# Patient Record
Sex: FEMALE | Race: WHITE | NOT HISPANIC OR LATINO | Employment: UNEMPLOYED | URBAN - METROPOLITAN AREA
[De-identification: names, ages, dates, MRNs, and addresses within clinical notes are randomized per-mention and may not be internally consistent; named-entity substitution may affect disease eponyms.]

---

## 2023-02-24 LAB
ALANINE AMINOTRANSFERASE (SGPT) (U/L) IN SER/PLAS: 16 U/L (ref 7–45)
ALBUMIN (G/DL) IN SER/PLAS: 4.4 G/DL (ref 3.4–5)
ALKALINE PHOSPHATASE (U/L) IN SER/PLAS: 92 U/L (ref 33–110)
ANION GAP IN SER/PLAS: 12 MMOL/L (ref 10–20)
ASPARTATE AMINOTRANSFERASE (SGOT) (U/L) IN SER/PLAS: 17 U/L (ref 9–39)
BILIRUBIN TOTAL (MG/DL) IN SER/PLAS: 0.5 MG/DL (ref 0–1.2)
CALCIDIOL (25 OH VITAMIN D3) (NG/ML) IN SER/PLAS: 27 NG/ML
CALCIUM (MG/DL) IN SER/PLAS: 9.6 MG/DL (ref 8.6–10.6)
CARBON DIOXIDE, TOTAL (MMOL/L) IN SER/PLAS: 29 MMOL/L (ref 21–32)
CHLORIDE (MMOL/L) IN SER/PLAS: 104 MMOL/L (ref 98–107)
CHOLESTEROL (MG/DL) IN SER/PLAS: 180 MG/DL (ref 0–199)
CHOLESTEROL IN HDL (MG/DL) IN SER/PLAS: 44.4 MG/DL
CHOLESTEROL/HDL RATIO: 4.1
CREATININE (MG/DL) IN SER/PLAS: 0.84 MG/DL (ref 0.5–1.05)
ERYTHROCYTE DISTRIBUTION WIDTH (RATIO) BY AUTOMATED COUNT: 12.8 % (ref 11.5–14.5)
ERYTHROCYTE MEAN CORPUSCULAR HEMOGLOBIN CONCENTRATION (G/DL) BY AUTOMATED: 31.8 G/DL (ref 32–36)
ERYTHROCYTE MEAN CORPUSCULAR VOLUME (FL) BY AUTOMATED COUNT: 92 FL (ref 80–100)
ERYTHROCYTES (10*6/UL) IN BLOOD BY AUTOMATED COUNT: 5.12 X10E12/L (ref 4–5.2)
ESTIMATED AVERAGE GLUCOSE FOR HBA1C: 97 MG/DL
GFR FEMALE: >90 ML/MIN/1.73M2
GLUCOSE (MG/DL) IN SER/PLAS: 80 MG/DL (ref 74–99)
HEMATOCRIT (%) IN BLOOD BY AUTOMATED COUNT: 46.9 % (ref 36–46)
HEMOGLOBIN (G/DL) IN BLOOD: 14.9 G/DL (ref 12–16)
HEMOGLOBIN A1C/HEMOGLOBIN TOTAL IN BLOOD: 5 %
LDL: 113 MG/DL (ref 0–99)
LEUKOCYTES (10*3/UL) IN BLOOD BY AUTOMATED COUNT: 10.8 X10E9/L (ref 4.4–11.3)
NRBC (PER 100 WBCS) BY AUTOMATED COUNT: 0 /100 WBC (ref 0–0)
PLATELETS (10*3/UL) IN BLOOD AUTOMATED COUNT: 313 X10E9/L (ref 150–450)
POTASSIUM (MMOL/L) IN SER/PLAS: 4.6 MMOL/L (ref 3.5–5.3)
PROTEIN TOTAL: 7.1 G/DL (ref 6.4–8.2)
SODIUM (MMOL/L) IN SER/PLAS: 140 MMOL/L (ref 136–145)
THYROTROPIN (MIU/L) IN SER/PLAS BY DETECTION LIMIT <= 0.05 MIU/L: 1.79 MIU/L (ref 0.44–3.98)
TRIGLYCERIDE (MG/DL) IN SER/PLAS: 114 MG/DL (ref 0–149)
UREA NITROGEN (MG/DL) IN SER/PLAS: 16 MG/DL (ref 6–23)
VLDL: 23 MG/DL (ref 0–40)

## 2023-04-14 ENCOUNTER — LAB (OUTPATIENT)
Dept: LAB | Facility: LAB | Age: 38
End: 2023-04-14

## 2023-04-14 LAB — TOBACCO SCREEN, URINE: NEGATIVE

## 2023-12-20 ENCOUNTER — PATIENT MESSAGE (OUTPATIENT)
Dept: PRIMARY CARE | Facility: CLINIC | Age: 38
End: 2023-12-20
Payer: COMMERCIAL

## 2023-12-20 DIAGNOSIS — Z91.018 TREE NUT ALLERGY: Primary | ICD-10-CM

## 2023-12-21 RX ORDER — EPINEPHRINE 0.3 MG/.3ML
1 INJECTION, SOLUTION INTRAMUSCULAR ONCE AS NEEDED
Qty: 1 EACH | Refills: 0 | Status: SHIPPED | OUTPATIENT
Start: 2023-12-21

## 2024-05-16 ENCOUNTER — OFFICE VISIT (OUTPATIENT)
Dept: OBSTETRICS AND GYNECOLOGY | Facility: CLINIC | Age: 39
End: 2024-05-16
Payer: COMMERCIAL

## 2024-05-16 VITALS
WEIGHT: 187 LBS | HEIGHT: 67 IN | BODY MASS INDEX: 29.35 KG/M2 | SYSTOLIC BLOOD PRESSURE: 122 MMHG | DIASTOLIC BLOOD PRESSURE: 72 MMHG

## 2024-05-16 DIAGNOSIS — Z01.419 ENCOUNTER FOR ANNUAL ROUTINE GYNECOLOGICAL EXAMINATION: Primary | ICD-10-CM

## 2024-05-16 DIAGNOSIS — Z31.9 PATIENT DESIRES PREGNANCY: ICD-10-CM

## 2024-05-16 PROCEDURE — 99395 PREV VISIT EST AGE 18-39: CPT | Performed by: OBSTETRICS & GYNECOLOGY

## 2024-05-16 PROCEDURE — 1036F TOBACCO NON-USER: CPT | Performed by: OBSTETRICS & GYNECOLOGY

## 2024-05-16 ASSESSMENT — PAIN SCALES - GENERAL: PAINLEVEL: 0-NO PAIN

## 2024-09-05 ENCOUNTER — APPOINTMENT (OUTPATIENT)
Dept: OBSTETRICS AND GYNECOLOGY | Facility: CLINIC | Age: 39
End: 2024-09-05
Payer: COMMERCIAL

## 2024-09-06 ENCOUNTER — APPOINTMENT (OUTPATIENT)
Dept: OBSTETRICS AND GYNECOLOGY | Facility: CLINIC | Age: 39
End: 2024-09-06
Payer: COMMERCIAL

## 2024-09-09 ENCOUNTER — APPOINTMENT (OUTPATIENT)
Dept: OBSTETRICS AND GYNECOLOGY | Facility: CLINIC | Age: 39
End: 2024-09-09
Payer: COMMERCIAL

## 2024-09-12 ENCOUNTER — INITIAL PRENATAL (OUTPATIENT)
Dept: OBSTETRICS AND GYNECOLOGY | Facility: CLINIC | Age: 39
End: 2024-09-12
Payer: COMMERCIAL

## 2024-09-12 VITALS — DIASTOLIC BLOOD PRESSURE: 60 MMHG | WEIGHT: 184 LBS | SYSTOLIC BLOOD PRESSURE: 120 MMHG | BODY MASS INDEX: 28.82 KG/M2

## 2024-09-12 DIAGNOSIS — Z23 INFLUENZA VACCINE NEEDED: ICD-10-CM

## 2024-09-12 DIAGNOSIS — O09.91 SUPERVISION OF HIGH RISK PREGNANCY, ANTEPARTUM, FIRST TRIMESTER (HHS-HCC): Primary | ICD-10-CM

## 2024-09-12 DIAGNOSIS — Z3A.10 10 WEEKS GESTATION OF PREGNANCY (HHS-HCC): ICD-10-CM

## 2024-09-12 DIAGNOSIS — O09.521 AMA (ADVANCED MATERNAL AGE) MULTIGRAVIDA 35+, FIRST TRIMESTER (HHS-HCC): ICD-10-CM

## 2024-09-12 PROCEDURE — 90471 IMMUNIZATION ADMIN: CPT | Performed by: OBSTETRICS & GYNECOLOGY

## 2024-09-12 PROCEDURE — 0500F INITIAL PRENATAL CARE VISIT: CPT | Performed by: OBSTETRICS & GYNECOLOGY

## 2024-09-12 PROCEDURE — 87491 CHLMYD TRACH DNA AMP PROBE: CPT

## 2024-09-12 PROCEDURE — 87591 N.GONORRHOEAE DNA AMP PROB: CPT

## 2024-09-12 PROCEDURE — 87086 URINE CULTURE/COLONY COUNT: CPT

## 2024-09-12 PROCEDURE — 90656 IIV3 VACC NO PRSV 0.5 ML IM: CPT | Performed by: OBSTETRICS & GYNECOLOGY

## 2024-09-12 ASSESSMENT — EDINBURGH POSTNATAL DEPRESSION SCALE (EPDS)
I HAVE BLAMED MYSELF UNNECESSARILY WHEN THINGS WENT WRONG: NO, NEVER
I HAVE FELT SCARED OR PANICKY FOR NO GOOD REASON: NO, NOT AT ALL
I HAVE BEEN ABLE TO LAUGH AND SEE THE FUNNY SIDE OF THINGS: AS MUCH AS I ALWAYS COULD
I HAVE BEEN SO UNHAPPY THAT I HAVE BEEN CRYING: NO, NEVER
I HAVE FELT SAD OR MISERABLE: NO, NOT AT ALL
THE THOUGHT OF HARMING MYSELF HAS OCCURRED TO ME: NEVER
THINGS HAVE BEEN GETTING ON TOP OF ME: NO, I HAVE BEEN COPING AS WELL AS EVER
I HAVE BEEN ANXIOUS OR WORRIED FOR NO GOOD REASON: YES, SOMETIMES
I HAVE LOOKED FORWARD WITH ENJOYMENT TO THINGS: AS MUCH AS I EVER DID
TOTAL SCORE: 4
I HAVE BEEN SO UNHAPPY THAT I HAVE HAD DIFFICULTY SLEEPING: YES, SOMETIMES

## 2024-09-12 NOTE — PROGRESS NOTES
NEW OB VISIT    Assessment/Plan    Problem List Items Addressed This Visit       AMA (advanced maternal age) multigravida 35+, first trimester ([facility])    Overview     Reviewed options of aneuploidy screening. Desires cfDNA         Relevant Orders    [facility] Prenatal Screen    Supervision of high risk pregnancy, antepartum, first trimester ([facility]) - Primary    Relevant Orders    CBC Anemia Panel With Reflex,Pregnancy    Hemoglobin A1C    Hemoglobin Identification with Path Review    Hepatitis B Core Antibody, Total    Hepatitis B surface Ag    Hepatitis B Surface Antibody    Hepatitis C Antibody    HIV 1/2 Antigen/Antibody Screen with Reflex to Confirmation    Rubella IgG    Syphilis Screen with Reflex    Type And Screen    [facility] Prenatal Screen    10 weeks gestation of pregnancy ([facility])    Overview     Desired provider in labor: [] CNM  [x] Physician  [] Blood Products: [] Yes, accepts [] No, needs counseling  [] Initial BMI: Could not be calculated   [] Prenatal Labs: ordered 9/12  [x] Cervical Cancer Screening up to date, NILM/neg HPV 3/2023  [] Rh status:   [] Genetic Screening:  desires cfDNA, ordered  [] NT US: (11-13 wks): ordered  [] Baby ASA (if indicated): Not indicated  [] Pregnancy dated by:     [] Anatomy US: (19-20 wks)  [] Federal Sterilization consent signed (if indicated):  [] 1hr GCT at 24-28wks:  [] Rhogam (if indicated):   [] Fetal Surveillance (if indicated):  [] Tdap (27-32 wks, may be given up to 36 wks if initial window missed):   [] RSV (32-36 wks) (Sept. to end of Jan):   [x] Flu Vaccine: Given 9/12/24    [] Breastfeeding:  [] Postpartum Birth control method:   [] GBS at 36 - 37 wks:  [] 39 weeks discussion of IOL vs. Expectant management:  [] Mode of delivery ( anticipated ):           Relevant Orders    C. Trachomatis / N. Gonorrhoeae, Amplified Detection (Completed)    Urine culture    US MAC OB imaging order     Other Visit Diagnoses       Influenza vaccine needed         Relevant Orders    Flu vaccine, trivalent, preservative free, age 6 months and greater (Fluraix/Fluzone/Flulaval) (Completed)          Follow up in 4 weeks or sooner as needed    Paulina Lr MD        Subjective     Pat Rg is a 38 y.o.  at 10w3d by LMP alone who presents for an initial prenatal visit.     Using ginger/B6/unisom. Nausea minimal vomiting    OB Hx: 5 and 8yo sons  2017: VAVD, spontaneous labor, no epidural, second degree x 2  2019: uncomplicated  at [place]  Past Medical Hx: h/o PP depression  Past Surgical Hx: removal of wisdom teeth  Social Hx: Denies tobacco, alcohol, drugs  Family Hx: Non contributory  Medications: PNV  Allergies: epi pen for tree nut allergy  Pap Hx: NILM/neg HPV in 3/2023  Flu vaccine: accepts seasonal flu vaccine today     Physical Exam  Objective   /60   Wt 83.5 kg (184 lb)   LMP 2024   BMI 28.82 kg/m²      General:   Alert and oriented, in no acute distress

## 2024-09-13 PROBLEM — O09.521 AMA (ADVANCED MATERNAL AGE) MULTIGRAVIDA 35+, FIRST TRIMESTER (HHS-HCC): Status: ACTIVE | Noted: 2024-09-13

## 2024-09-13 PROBLEM — O09.91 SUPERVISION OF HIGH RISK PREGNANCY, ANTEPARTUM, FIRST TRIMESTER: Status: ACTIVE | Noted: 2024-09-13

## 2024-09-13 PROBLEM — Z3A.10 10 WEEKS GESTATION OF PREGNANCY (HHS-HCC): Status: ACTIVE | Noted: 2024-09-13

## 2024-09-13 LAB
C TRACH RRNA SPEC QL NAA+PROBE: NEGATIVE
N GONORRHOEA DNA SPEC QL PROBE+SIG AMP: NEGATIVE

## 2024-09-14 LAB — BACTERIA UR CULT: NO GROWTH

## 2024-09-20 ENCOUNTER — LAB (OUTPATIENT)
Dept: LAB | Facility: LAB | Age: 39
End: 2024-09-20
Payer: COMMERCIAL

## 2024-09-20 ENCOUNTER — HOSPITAL ENCOUNTER (OUTPATIENT)
Dept: RADIOLOGY | Facility: HOSPITAL | Age: 39
Discharge: HOME | End: 2024-09-20
Payer: COMMERCIAL

## 2024-09-20 DIAGNOSIS — Z3A.10 10 WEEKS GESTATION OF PREGNANCY (HHS-HCC): ICD-10-CM

## 2024-09-20 DIAGNOSIS — O09.91 SUPERVISION OF HIGH RISK PREGNANCY, ANTEPARTUM, FIRST TRIMESTER: ICD-10-CM

## 2024-09-20 LAB
ABO GROUP (TYPE) IN BLOOD: NORMAL
ANTIBODY SCREEN: NORMAL
ERYTHROCYTE [DISTWIDTH] IN BLOOD BY AUTOMATED COUNT: 12.4 % (ref 11.5–14.5)
EST. AVERAGE GLUCOSE BLD GHB EST-MCNC: 103 MG/DL
HBA1C MFR BLD: 5.2 %
HBV CORE AB SER QL: NONREACTIVE
HBV SURFACE AB SER-ACNC: 15.2 MIU/ML
HBV SURFACE AG SERPL QL IA: NONREACTIVE
HCT VFR BLD AUTO: 37.6 % (ref 36–46)
HCV AB SER QL: NONREACTIVE
HGB BLD-MCNC: 12.7 G/DL (ref 12–16)
HIV 1+2 AB+HIV1 P24 AG SERPL QL IA: NONREACTIVE
MCH RBC QN AUTO: 30 PG (ref 26–34)
MCHC RBC AUTO-ENTMCNC: 33.8 G/DL (ref 32–36)
MCV RBC AUTO: 89 FL (ref 80–100)
NRBC BLD-RTO: 0 /100 WBCS (ref 0–0)
PLATELET # BLD AUTO: 255 X10*3/UL (ref 150–450)
RBC # BLD AUTO: 4.24 X10*6/UL (ref 4–5.2)
REFLEX ADDED, ANEMIA PANEL: NORMAL
RH FACTOR (ANTIGEN D): NORMAL
RUBV IGG SERPL IA-ACNC: 3.9 IA
RUBV IGG SERPL QL IA: POSITIVE
TREPONEMA PALLIDUM IGG+IGM AB [PRESENCE] IN SERUM OR PLASMA BY IMMUNOASSAY: NONREACTIVE
WBC # BLD AUTO: 9.5 X10*3/UL (ref 4.4–11.3)

## 2024-09-20 PROCEDURE — 86850 RBC ANTIBODY SCREEN: CPT

## 2024-09-20 PROCEDURE — 86706 HEP B SURFACE ANTIBODY: CPT

## 2024-09-20 PROCEDURE — 83036 HEMOGLOBIN GLYCOSYLATED A1C: CPT

## 2024-09-20 PROCEDURE — 87389 HIV-1 AG W/HIV-1&-2 AB AG IA: CPT

## 2024-09-20 PROCEDURE — 76801 OB US < 14 WKS SINGLE FETUS: CPT

## 2024-09-20 PROCEDURE — 36415 COLL VENOUS BLD VENIPUNCTURE: CPT

## 2024-09-20 PROCEDURE — 83020 HEMOGLOBIN ELECTROPHORESIS: CPT | Performed by: OBSTETRICS & GYNECOLOGY

## 2024-09-20 PROCEDURE — 86704 HEP B CORE ANTIBODY TOTAL: CPT

## 2024-09-20 PROCEDURE — 85027 COMPLETE CBC AUTOMATED: CPT

## 2024-09-20 PROCEDURE — 86780 TREPONEMA PALLIDUM: CPT

## 2024-09-20 PROCEDURE — 83021 HEMOGLOBIN CHROMOTOGRAPHY: CPT

## 2024-09-20 PROCEDURE — 86803 HEPATITIS C AB TEST: CPT

## 2024-09-20 PROCEDURE — 86317 IMMUNOASSAY INFECTIOUS AGENT: CPT

## 2024-09-20 PROCEDURE — 86900 BLOOD TYPING SEROLOGIC ABO: CPT

## 2024-09-20 PROCEDURE — 86901 BLOOD TYPING SEROLOGIC RH(D): CPT

## 2024-09-20 PROCEDURE — 87340 HEPATITIS B SURFACE AG IA: CPT

## 2024-09-23 LAB
HEMOGLOBIN A2: 2.9 % (ref 2–3.5)
HEMOGLOBIN A: 96.8 % (ref 95.8–98)
HEMOGLOBIN F: 0.3 % (ref 0–2)
HEMOGLOBIN IDENTIFICATION INTERPRETATION: NORMAL
PATH REVIEW-HGB IDENTIFICATION: NORMAL

## 2024-10-03 LAB
COMMENTS - MP RESULT TYPE: NORMAL
SCAN RESULT: NORMAL

## 2024-10-11 ENCOUNTER — APPOINTMENT (OUTPATIENT)
Dept: OBSTETRICS AND GYNECOLOGY | Facility: CLINIC | Age: 39
End: 2024-10-11
Payer: COMMERCIAL

## 2024-10-11 VITALS — DIASTOLIC BLOOD PRESSURE: 70 MMHG | SYSTOLIC BLOOD PRESSURE: 120 MMHG | BODY MASS INDEX: 28.98 KG/M2 | WEIGHT: 185 LBS

## 2024-10-11 DIAGNOSIS — Z3A.14 14 WEEKS GESTATION OF PREGNANCY (HHS-HCC): ICD-10-CM

## 2024-10-11 DIAGNOSIS — O09.91 SUPERVISION OF HIGH RISK PREGNANCY, ANTEPARTUM, FIRST TRIMESTER: Primary | ICD-10-CM

## 2024-10-11 DIAGNOSIS — O09.521 AMA (ADVANCED MATERNAL AGE) MULTIGRAVIDA 35+, FIRST TRIMESTER (HHS-HCC): ICD-10-CM

## 2024-10-11 PROCEDURE — 0501F PRENATAL FLOW SHEET: CPT | Performed by: OBSTETRICS & GYNECOLOGY

## 2024-10-11 NOTE — PROGRESS NOTES
OB Follow up visit    ASSESSMENT & PLAN    Pat Rg is a 38 y.o.  at 14w4d presenting for routine prenatal care    Problem List Items Addressed This Visit       AMA (advanced maternal age) multigravida 35+, first trimester ([facility])    Overview     Rr cfDNA  Hgb A1c 5.2         Supervision of high risk pregnancy, antepartum, first trimester - Primary    14 weeks gestation of pregnancy ([facility])    Overview     Desired provider in labor: [] CNM  [x] Physician  [] Blood Products: [] Yes, accepts [] No, needs counseling  [x] Initial BMI: 29  [x] Prenatal Labs: Rh+, RI, Hgb 12.7  [x] Cervical Cancer Screening up to date, NILM/neg HPV 3/2023  [x] Rh status: positive  [x] Genetic Screening:  rr cfDNA  [x] NT US: (11-13 wks): normal  [x] Baby ASA (if indicated): Not indicated  [x] Pregnancy dated by: 11 week ultrasound (cycles irregular)    [] Anatomy US: (19-20 wks)  [] Federal Sterilization consent signed (if indicated):  [] 1hr GCT at 24-28wks:  [] Rhogam (if indicated):   [] Fetal Surveillance (if indicated):  [] Tdap (27-32 wks, may be given up to 36 wks if initial window missed):   [] RSV (32-36 wks) (Sept. to end of ):   [x] Flu Vaccine: Given 24    [] Breastfeeding:  [] Postpartum Birth control method:   [] GBS at 36 - 37 wks:  [] 39 weeks discussion of IOL vs. Expectant management:  [] Mode of delivery ( anticipated ):                 SUBJECTIVE    HPI: Pat Rg is a 38 y.o.  at 14w4d here for RPNV. Patient reports feeling better. N/V resolved      OBJECTIVE    Visit Vitals  /70   Wt 83.9 kg (185 lb)   LMP 2024   BMI 28.98 kg/m²   OB Status Pregnant   Smoking Status Never   BSA 1.99 m²        Paulina Lr MD

## 2024-10-15 DIAGNOSIS — O26.892 PREGNANCY HEADACHE IN SECOND TRIMESTER (HHS-HCC): Primary | ICD-10-CM

## 2024-10-15 DIAGNOSIS — R51.9 PREGNANCY HEADACHE IN SECOND TRIMESTER (HHS-HCC): Primary | ICD-10-CM

## 2024-10-15 RX ORDER — CYPROHEPTADINE HYDROCHLORIDE 4 MG/1
4 TABLET ORAL 3 TIMES DAILY PRN
Qty: 30 TABLET | Refills: 0 | Status: SHIPPED | OUTPATIENT
Start: 2024-10-15 | End: 2025-01-13

## 2024-10-28 ENCOUNTER — TELEPHONE (OUTPATIENT)
Dept: OBSTETRICS AND GYNECOLOGY | Facility: CLINIC | Age: 39
End: 2024-10-28
Payer: COMMERCIAL

## 2024-10-28 DIAGNOSIS — R51.9 PREGNANCY HEADACHE IN SECOND TRIMESTER (HHS-HCC): ICD-10-CM

## 2024-10-28 DIAGNOSIS — O26.892 PREGNANCY HEADACHE IN SECOND TRIMESTER (HHS-HCC): ICD-10-CM

## 2024-10-28 RX ORDER — CYPROHEPTADINE HYDROCHLORIDE 4 MG/1
4 TABLET ORAL 3 TIMES DAILY PRN
Qty: 30 TABLET | Refills: 1 | Status: SHIPPED | OUTPATIENT
Start: 2024-10-28 | End: 2025-01-26

## 2024-11-07 ENCOUNTER — APPOINTMENT (OUTPATIENT)
Dept: OBSTETRICS AND GYNECOLOGY | Facility: CLINIC | Age: 39
End: 2024-11-07
Payer: COMMERCIAL

## 2024-11-07 VITALS — DIASTOLIC BLOOD PRESSURE: 60 MMHG | SYSTOLIC BLOOD PRESSURE: 100 MMHG | BODY MASS INDEX: 29.91 KG/M2 | WEIGHT: 191 LBS

## 2024-11-07 DIAGNOSIS — O26.892 PREGNANCY HEADACHE IN SECOND TRIMESTER (HHS-HCC): ICD-10-CM

## 2024-11-07 DIAGNOSIS — O09.521 AMA (ADVANCED MATERNAL AGE) MULTIGRAVIDA 35+, FIRST TRIMESTER (HHS-HCC): ICD-10-CM

## 2024-11-07 DIAGNOSIS — R51.9 PREGNANCY HEADACHE IN SECOND TRIMESTER (HHS-HCC): ICD-10-CM

## 2024-11-07 DIAGNOSIS — O09.91 SUPERVISION OF HIGH RISK PREGNANCY, ANTEPARTUM, FIRST TRIMESTER: ICD-10-CM

## 2024-11-07 DIAGNOSIS — Z3A.18 18 WEEKS GESTATION OF PREGNANCY (HHS-HCC): Primary | ICD-10-CM

## 2024-11-07 PROCEDURE — 0501F PRENATAL FLOW SHEET: CPT | Performed by: OBSTETRICS & GYNECOLOGY

## 2024-11-15 ENCOUNTER — HOSPITAL ENCOUNTER (OUTPATIENT)
Dept: RADIOLOGY | Facility: HOSPITAL | Age: 39
Discharge: HOME | End: 2024-11-15
Payer: COMMERCIAL

## 2024-11-15 DIAGNOSIS — Z3A.10 10 WEEKS GESTATION OF PREGNANCY (HHS-HCC): ICD-10-CM

## 2024-11-15 PROCEDURE — 76811 OB US DETAILED SNGL FETUS: CPT

## 2024-12-06 ENCOUNTER — APPOINTMENT (OUTPATIENT)
Dept: OBSTETRICS AND GYNECOLOGY | Facility: CLINIC | Age: 39
End: 2024-12-06
Payer: COMMERCIAL

## 2024-12-06 ENCOUNTER — APPOINTMENT (OUTPATIENT)
Dept: ENDOCRINOLOGY | Facility: CLINIC | Age: 39
End: 2024-12-06
Payer: COMMERCIAL

## 2024-12-06 VITALS — BODY MASS INDEX: 31.32 KG/M2 | SYSTOLIC BLOOD PRESSURE: 100 MMHG | WEIGHT: 200 LBS | DIASTOLIC BLOOD PRESSURE: 68 MMHG

## 2024-12-06 DIAGNOSIS — O09.92 SUPERVISION OF HIGH-RISK PREGNANCY, SECOND TRIMESTER (HHS-HCC): Primary | ICD-10-CM

## 2024-12-06 DIAGNOSIS — Z3A.22 22 WEEKS GESTATION OF PREGNANCY (HHS-HCC): ICD-10-CM

## 2024-12-06 DIAGNOSIS — R51.9 PREGNANCY HEADACHE IN SECOND TRIMESTER (HHS-HCC): ICD-10-CM

## 2024-12-06 DIAGNOSIS — O09.521 AMA (ADVANCED MATERNAL AGE) MULTIGRAVIDA 35+, FIRST TRIMESTER (HHS-HCC): ICD-10-CM

## 2024-12-06 DIAGNOSIS — O26.892 PREGNANCY HEADACHE IN SECOND TRIMESTER (HHS-HCC): ICD-10-CM

## 2024-12-06 PROCEDURE — 0501F PRENATAL FLOW SHEET: CPT | Performed by: OBSTETRICS & GYNECOLOGY

## 2024-12-06 NOTE — PROGRESS NOTES
Ob Follow-up  24     SUBJECTIVE      HPI: Pat Rg is a 39 y.o.  at 22w3d here for RPNV.  She has no contractions, bleeding, or LOF. Reports normal fetal movement. Patient reports headaches better. Decreased in frequency.        OBJECTIVE  Visit Vitals  /68   Wt 90.7 kg (200 lb)   LMP 2024   BMI 31.32 kg/m²   OB Status Pregnant   Smoking Status Never   BSA 2.07 m²            ASSESSMENT & PLAN    Pat Rg is a 39 y.o.  at 22w3d here for the following concerns we addressed today:    Problem List Items Addressed This Visit       22 weeks gestation of pregnancy ([facility])    Overview     Desired provider in labor: [] CNM  [x] Physician  [x] Blood Products: [x] Yes, accepts [] No, needs counseling  [x] Initial BMI: 29  [x] Prenatal Labs: Rh+, RI, Hgb 12.7  [x] Cervical Cancer Screening up to date, NILM/neg HPV 3/2023  [x] Rh status: positive  [x] Genetic Screening:  rr cfDNA  [x] NT US: (11-13 wks): normal  [x] Baby ASA (if indicated): Not indicated  [x] Pregnancy dated by: 11 week ultrasound (cycles irregular)    [x] Anatomy US: (19-20 wks): normal  [] Federal Sterilization consent signed (if indicated):  [] 1hr GCT at 24-28wks: ordered for NV  [x] Rhogam (if indicated): Not indicated  [] Fetal Surveillance (if indicated):  [] Tdap (27-32 wks, may be given up to 36 wks if initial window missed):   [] RSV (32-36 wks) (Sept. to end of ):   [x] Flu Vaccine: Given 24    [] Breastfeeding:  [] Postpartum Birth control method:   [] GBS at 36 - 37 wks:  [] 39 weeks discussion of IOL vs. Expectant management:  [] Mode of delivery ( anticipated ):           AMA (advanced maternal age) multigravida 35+, first trimester ([facility])    Overview     Rr cfDNA  Hgb A1c 5.2         Pregnancy headache in second trimester ([facility])    Overview     Uses tylenol and periactin as needed  Improved, less frequent         Supervision of high-risk pregnancy, second trimester ([facility]) - Primary     Relevant Orders    CBC Anemia Panel With Reflex,Pregnancy    Glucose, 1 Hour Screen, Pregnancy    Syphilis Screen with Reflex     RTC in 4 weeks      Paulina Lr MD

## 2025-01-03 ENCOUNTER — APPOINTMENT (OUTPATIENT)
Dept: OBSTETRICS AND GYNECOLOGY | Facility: CLINIC | Age: 40
End: 2025-01-03
Payer: COMMERCIAL

## 2025-01-03 ENCOUNTER — TELEPHONE (OUTPATIENT)
Dept: OBSTETRICS AND GYNECOLOGY | Facility: CLINIC | Age: 40
End: 2025-01-03

## 2025-01-03 ENCOUNTER — LAB (OUTPATIENT)
Dept: LAB | Facility: LAB | Age: 40
End: 2025-01-03
Payer: COMMERCIAL

## 2025-01-03 VITALS — SYSTOLIC BLOOD PRESSURE: 120 MMHG | DIASTOLIC BLOOD PRESSURE: 68 MMHG | WEIGHT: 214 LBS | BODY MASS INDEX: 33.52 KG/M2

## 2025-01-03 DIAGNOSIS — O26.892 PREGNANCY HEADACHE IN SECOND TRIMESTER (HHS-HCC): ICD-10-CM

## 2025-01-03 DIAGNOSIS — O09.92 SUPERVISION OF HIGH-RISK PREGNANCY, SECOND TRIMESTER (HHS-HCC): ICD-10-CM

## 2025-01-03 DIAGNOSIS — O09.522 AMA (ADVANCED MATERNAL AGE) MULTIGRAVIDA 35+, SECOND TRIMESTER (HHS-HCC): Primary | ICD-10-CM

## 2025-01-03 DIAGNOSIS — R51.9 PREGNANCY HEADACHE IN SECOND TRIMESTER (HHS-HCC): ICD-10-CM

## 2025-01-03 DIAGNOSIS — Z3A.26 26 WEEKS GESTATION OF PREGNANCY (HHS-HCC): ICD-10-CM

## 2025-01-03 LAB
ERYTHROCYTE [DISTWIDTH] IN BLOOD BY AUTOMATED COUNT: 13.2 % (ref 11.5–14.5)
GLUCOSE 1H P 50 G GLC PO SERPL-MCNC: 104 MG/DL
HCT VFR BLD AUTO: 38.3 % (ref 36–46)
HGB BLD-MCNC: 12.4 G/DL (ref 12–16)
MCH RBC QN AUTO: 30 PG (ref 26–34)
MCHC RBC AUTO-ENTMCNC: 32.4 G/DL (ref 32–36)
MCV RBC AUTO: 93 FL (ref 80–100)
NRBC BLD-RTO: 0 /100 WBCS (ref 0–0)
PLATELET # BLD AUTO: 195 X10*3/UL (ref 150–450)
RBC # BLD AUTO: 4.13 X10*6/UL (ref 4–5.2)
REFLEX ADDED, ANEMIA PANEL: NORMAL
TREPONEMA PALLIDUM IGG+IGM AB [PRESENCE] IN SERUM OR PLASMA BY IMMUNOASSAY: NONREACTIVE
WBC # BLD AUTO: 8.8 X10*3/UL (ref 4.4–11.3)

## 2025-01-03 PROCEDURE — 82947 ASSAY GLUCOSE BLOOD QUANT: CPT

## 2025-01-03 PROCEDURE — 85027 COMPLETE CBC AUTOMATED: CPT

## 2025-01-03 PROCEDURE — 86780 TREPONEMA PALLIDUM: CPT

## 2025-01-03 PROCEDURE — 0501F PRENATAL FLOW SHEET: CPT | Performed by: OBSTETRICS & GYNECOLOGY

## 2025-01-03 NOTE — PROGRESS NOTES
Ob Follow-up  25     SUBJECTIVE      HPI: Pat Rg is a 39 y.o.  at 26w3d here for RPNV.  She has no contractions, bleeding, or LOF. Reports normal fetal movement. Patient reports tired       OBJECTIVE  Visit Vitals  /68   Wt 97.1 kg (214 lb)   LMP 2024   BMI 33.52 kg/m²   OB Status Pregnant   Smoking Status Never   BSA 2.14 m²            ASSESSMENT & PLAN    Pat Rg is a 39 y.o.  at 26w3d here for the following concerns we addressed today:    Problem List Items Addressed This Visit          Pregnancy    Pregnancy headache in second trimester ([facility])    Overview     Uses tylenol and periactin as needed  Improved, less frequent         AMA (advanced maternal age) multigravida 35+, second trimester ([facility]) - Primary    Overview     Rr cfDNA  Hgb A1c 5.2         26 weeks gestation of pregnancy ([facility])    Overview     Desired provider in labor: [] CNM  [x] Physician  [x] Blood Products: [x] Yes, accepts [] No, needs counseling  [x] Initial BMI: 29  [x] Prenatal Labs: Rh+, RI, Hgb 12.7  [x] Cervical Cancer Screening up to date, NILM/neg HPV 3/2023  [x] Rh status: positive  [x] Genetic Screening:  rr cfDNA  [x] NT US: (11-13 wks): normal  [x] Baby ASA (if indicated): Not indicated  [x] Pregnancy dated by: 11 week ultrasound (cycles irregular)    [x] Anatomy US: (19-20 wks): normal  [x] Federal Sterilization consent signed (if indicated): Declines BTL  [] 1hr GCT at 24-28wks: pending from today  [x] Rhogam (if indicated): Not indicated  [] Fetal Surveillance (if indicated):  [] Tdap (27-32 wks, may be given up to 36 wks if initial window missed): discussed for NV  [] RSV (32-36 wks) (Sept. to end of ):   [x] Flu Vaccine: Given 24    [x] Breastfeeding: plans to bf, need new pump  [x] Postpartum Birth control method: Discussed, considering ParaGard IUD at 6 week PP visit  [] GBS at 36 - 37 wks:  [] 39 weeks discussion of IOL vs. Expectant management:  [x] Mode of  delivery ( anticipated ):                 No orders of the defined types were placed in this encounter.    RTC in 4 weeks      Paulina Lr MD

## 2025-01-13 ENCOUNTER — OFFICE VISIT (OUTPATIENT)
Dept: URGENT CARE | Age: 40
End: 2025-01-13
Payer: COMMERCIAL

## 2025-01-13 VITALS
BODY MASS INDEX: 33.67 KG/M2 | HEART RATE: 84 BPM | OXYGEN SATURATION: 98 % | DIASTOLIC BLOOD PRESSURE: 74 MMHG | TEMPERATURE: 98 F | WEIGHT: 215 LBS | SYSTOLIC BLOOD PRESSURE: 123 MMHG | RESPIRATION RATE: 16 BRPM

## 2025-01-13 DIAGNOSIS — R09.81 NASAL CONGESTION: ICD-10-CM

## 2025-01-13 DIAGNOSIS — H66.91 ACUTE RIGHT OTITIS MEDIA: Primary | ICD-10-CM

## 2025-01-13 PROCEDURE — 99203 OFFICE O/P NEW LOW 30 MIN: CPT | Performed by: NURSE PRACTITIONER

## 2025-01-13 RX ORDER — AMOXICILLIN 875 MG/1
875 TABLET, FILM COATED ORAL 2 TIMES DAILY
Qty: 20 TABLET | Refills: 0 | Status: SHIPPED | OUTPATIENT
Start: 2025-01-13 | End: 2025-01-23

## 2025-01-13 ASSESSMENT — PAIN SCALES - GENERAL: PAINLEVEL_OUTOF10: 7

## 2025-01-13 ASSESSMENT — ENCOUNTER SYMPTOMS
SORE THROAT: 0
COUGH: 0
SINUS PRESSURE: 1
SINUS COMPLAINT: 1
FATIGUE: 1
SINUS PAIN: 1

## 2025-01-13 NOTE — PROGRESS NOTES
Subjective   Patient ID: Pat Rg is a 39 y.o. female. They present today with a chief complaint of Sinus Problem (Sinus congestion/pressure, right earache X 2 wks. ).    History of Present Illness    Sinus Problem  Associated symptoms: congestion, ear pain and fatigue    Associated symptoms: no cough and no sore throat         patient presents to urgent care for complaints of sinus pressure and congestion for 2 weeks.  Patient states for the past week her right ear has been hurting.  She states the past couple days she has had increasing pain in her right ear.  Patient is currently 28 weeks pregnant.  She has been using Tylenol and Sudafed.  Reports no improvement of her symptoms.  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     reports that she has never smoked. She has never used smokeless tobacco. She reports that she does not currently use alcohol after a past usage of about 2.0 standard drinks of alcohol per week. She reports that she does not use drugs.    Review of Systems  Review of Systems   Constitutional:  Positive for fatigue.   HENT:  Positive for congestion, ear pain, postnasal drip, sinus pressure and sinus pain. Negative for sore throat.    Respiratory:  Negative for cough.                                   Objective    Vitals:    01/13/25 1803   BP: 123/74   Pulse: 84   Resp: 16   Temp: 36.7 °C (98 °F)   SpO2: 98%   Weight: 97.5 kg (215 lb)     Patient's last menstrual period was 07/01/2024.    Physical Exam  Vitals reviewed.   Constitutional:       Appearance: Normal appearance.   HENT:      Head: Normocephalic.      Right Ear: Ear canal and external ear normal. Tympanic membrane is erythematous and bulging.      Left Ear: Tympanic membrane, ear  canal and external ear normal.      Mouth/Throat:      Pharynx: Postnasal drip present.   Cardiovascular:      Rate and Rhythm: Normal rate and regular rhythm.      Heart sounds: Normal heart sounds.   Pulmonary:      Effort: Pulmonary effort is normal.      Breath sounds: Normal breath sounds and air entry.   Skin:     General: Skin is warm and dry.   Neurological:      Mental Status: She is alert.         Procedures    Point of Care Test & Imaging Results from this visit  No results found for this visit on 01/13/25.   No results found.    Diagnostic study results (if any) were reviewed by TIEN Higuera.    Assessment/Plan   Allergies, medications, history, and pertinent labs/EKGs/Imaging reviewed by TIEN Higuera.     Medical Decision Making  Will start patient on amoxicillin for acute right otitis media.  Patient was told she can continue taking Tylenol as needed.  Follow-up with PCP or return urgent care if symptoms are not improving or worsening in the next week.    At time of discharge patient was clinically well-appearing and HDS for outpatient management. The patient and/or family was educated regarding diagnosis, supportive care, OTC and Rx medications. The patient and/or family was given the opportunity to ask questions prior to discharge.  They verbalized understanding of my discussion of the plans for treatment, expected course, indications to return to  or seek further evaluation in ED, and the need for timely follow up as directed.   They were provided with a work/school excuse if requested.      Orders and Diagnoses  Diagnoses and all orders for this visit:  Acute right otitis media  -     amoxicillin (Amoxil) 875 mg tablet; Take 1 tablet (875 mg) by mouth 2 times a day for 10 days.  Nasal congestion      Medical Admin Record      Patient disposition: Home    Electronically signed by TIEN Higuera  6:13 PM

## 2025-01-24 ENCOUNTER — APPOINTMENT (OUTPATIENT)
Dept: OBSTETRICS AND GYNECOLOGY | Facility: CLINIC | Age: 40
End: 2025-01-24
Payer: COMMERCIAL

## 2025-01-24 VITALS — WEIGHT: 218 LBS | SYSTOLIC BLOOD PRESSURE: 120 MMHG | DIASTOLIC BLOOD PRESSURE: 60 MMHG | BODY MASS INDEX: 34.14 KG/M2

## 2025-01-24 DIAGNOSIS — R51.9 PREGNANCY HEADACHE IN SECOND TRIMESTER (HHS-HCC): Primary | ICD-10-CM

## 2025-01-24 DIAGNOSIS — Z3A.29 29 WEEKS GESTATION OF PREGNANCY (HHS-HCC): ICD-10-CM

## 2025-01-24 DIAGNOSIS — O09.522 AMA (ADVANCED MATERNAL AGE) MULTIGRAVIDA 35+, SECOND TRIMESTER (HHS-HCC): ICD-10-CM

## 2025-01-24 DIAGNOSIS — O26.892 PREGNANCY HEADACHE IN SECOND TRIMESTER (HHS-HCC): Primary | ICD-10-CM

## 2025-01-24 PROCEDURE — 0501F PRENATAL FLOW SHEET: CPT | Performed by: OBSTETRICS & GYNECOLOGY

## 2025-01-24 NOTE — PROGRESS NOTES
Ob Follow-up  25     SUBJECTIVE      HPI: Pat Rg is a 39 y.o.  at 29w3d here for RPNV.  She has rare contractions, no bleeding, or LOF. Reports normal fetal movement. Patient reports lingering URI symptoms with ear infection. Starting to feel better.        OBJECTIVE  Visit Vitals  /60   Wt 98.9 kg (218 lb)   LMP 2024   BMI 34.14 kg/m²   OB Status Pregnant   Smoking Status Never   BSA 2.16 m²            ASSESSMENT & PLAN    Pat Rg is a 39 y.o.  at 29w3d here for the following concerns we addressed today:    Problem List Items Addressed This Visit          Pregnancy    Pregnancy headache in second trimester ([facility]) - Primary    Overview     Uses tylenol and periactin as needed  Improved, less frequent         AMA (advanced maternal age) multigravida 35+, second trimester ([facility])    Overview     Rr cfDNA  Hgb A1c 5.2         29 weeks gestation of pregnancy ([facility])    Overview     Desired provider in labor: [] CNM  [x] Physician  [x] Blood Products: [x] Yes, accepts [] No, needs counseling  [x] Initial BMI: 29  [x] Prenatal Labs: Rh+, RI, Hgb 12.7  [x] Cervical Cancer Screening up to date, NILM/neg HPV 3/2023  [x] Rh status: positive  [x] Genetic Screening:  rr cfDNA  [x] NT US: (11-13 wks): normal  [x] Baby ASA (if indicated): Not indicated  [x] Pregnancy dated by: 11 week ultrasound (cycles irregular)    [x] Anatomy US: (19-20 wks): normal  [x] Federal Sterilization consent signed (if indicated): Declines BTL  [x] 1hr GCT at 24-28wks: normal, 104  [x] Rhogam (if indicated): Not indicated  [x] Fetal Surveillance (if indicated): Not indicated at this time  [] Tdap (27-32 wks, may be given up to 36 wks if initial window missed): deferred  due to not feeling well, accepts at [place]  [x] RSV (32-36 wks) (Sept. to end of ): N/A  [x] Flu Vaccine: Given 24    [x] Breastfeeding: plans to bf, need new pump  [x] Postpartum Birth control method: Discussed, considering  ParaGard IUD at 6 week PP visit  [] GBS at 36 - 37 wks:  [] 39 weeks discussion of IOL vs. Expectant management:  [x] Mode of delivery ( anticipated ):                 No orders of the defined types were placed in this encounter.     RTC in 2 weeks      Paulina Lr MD

## 2025-02-05 ENCOUNTER — OFFICE VISIT (OUTPATIENT)
Dept: URGENT CARE | Age: 40
End: 2025-02-05
Payer: COMMERCIAL

## 2025-02-05 VITALS
OXYGEN SATURATION: 96 % | HEART RATE: 87 BPM | DIASTOLIC BLOOD PRESSURE: 71 MMHG | RESPIRATION RATE: 16 BRPM | TEMPERATURE: 98.3 F | SYSTOLIC BLOOD PRESSURE: 107 MMHG

## 2025-02-05 DIAGNOSIS — J06.9 VIRAL UPPER RESPIRATORY TRACT INFECTION: Primary | ICD-10-CM

## 2025-02-05 PROCEDURE — 1036F TOBACCO NON-USER: CPT | Performed by: NURSE PRACTITIONER

## 2025-02-05 PROCEDURE — 99213 OFFICE O/P EST LOW 20 MIN: CPT | Performed by: NURSE PRACTITIONER

## 2025-02-05 RX ORDER — CYPROHEPTADINE HYDROCHLORIDE 4 MG/1
TABLET ORAL 3 TIMES DAILY PRN
COMMUNITY

## 2025-02-05 ASSESSMENT — ENCOUNTER SYMPTOMS
SORE THROAT: 0
CHILLS: 0
VOMITING: 0
FEVER: 0
SHORTNESS OF BREATH: 0
RHINORRHEA: 1
HEADACHES: 0
NAUSEA: 0
DIARRHEA: 0
COUGH: 1
FATIGUE: 1

## 2025-02-05 NOTE — PROGRESS NOTES
Subjective   Patient ID: Pat Rg is a 39 y.o. female. They present today with a chief complaint of Cough and Nasal Congestion (Sick X 1 month. Patient is 31 weeks pregnant. CANDIDO).    History of Present Illness  Patient presents with concern for 3-day history of cold/sinus symptoms. States she was here about a month ago with sinus/ear symptoms. Completed a course of amox and now sick again. Endorses sudafed intermittently for symptoms.         Cough  Associated symptoms include ear pain, postnasal drip and rhinorrhea. Pertinent negatives include no chest pain, chills, fever, headaches, sore throat or shortness of breath.       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 No pertinent surgical history.     reports that she has never smoked. She has never used smokeless tobacco. She reports that she does not currently use alcohol after a past usage of about 2.0 standard drinks of alcohol per week. She reports that she does not use drugs.    Review of Systems  Review of Systems   Constitutional:  Positive for fatigue. Negative for chills and fever.   HENT:  Positive for ear pain, postnasal drip and rhinorrhea. Negative for sore throat.    Respiratory:  Positive for cough. Negative for shortness of breath.    Cardiovascular:  Negative for chest pain.   Gastrointestinal:  Negative for diarrhea, nausea and vomiting.   Neurological:  Negative for headaches.                                  Objective    Vitals:    02/05/25 0916   BP: 107/71   Pulse: 87   Resp: 16   Temp: 36.8 °C (98.3 °F)   SpO2: 96%     Patient's last menstrual period was 07/01/2024.    Physical Exam  Vitals reviewed.   Constitutional:       Appearance: Normal appearance.   HENT:      Right Ear: Tympanic membrane and ear  canal normal.      Left Ear: Tympanic membrane and ear canal normal.      Mouth/Throat:      Mouth: Mucous membranes are moist.      Pharynx: Oropharynx is clear.   Cardiovascular:      Rate and Rhythm: Normal rate and regular rhythm.   Pulmonary:      Effort: Pulmonary effort is normal.      Breath sounds: Normal breath sounds.   Skin:     General: Skin is warm and dry.   Neurological:      Mental Status: She is alert.         Procedures    Point of Care Test & Imaging Results from this visit  No results found for this visit on 02/05/25.   No results found.    Diagnostic study results (if any) were reviewed by TIEN Beasley.    Assessment/Plan   Allergies, medications, history, and pertinent labs/EKGs/Imaging reviewed by TIEN Beasley. Continue supportive measures, and symptom management. F/u if s/s increase or worsen.       Medical Decision Making  At time of discharge patient was clinically well-appearing and HDS for outpatient management. The patient and/or family was educated regarding diagnosis, supportive care, OTC and Rx medications. The patient and/or family was given the opportunity to ask questions prior to discharge.  They verbalized understanding of my discussion of the plans for treatment, expected course, indications to return to  or seek further evaluation in ED, and the need for timely follow up as directed.   They were provided with a work/school excuse if requested.      Orders and Diagnoses  Diagnoses and all orders for this visit:  Viral upper respiratory tract infection      Medical Admin Record      Patient disposition: Home    Electronically signed by TIEN Beasley  9:25 AM

## 2025-02-14 ENCOUNTER — HOSPITAL ENCOUNTER (OUTPATIENT)
Facility: HOSPITAL | Age: 40
End: 2025-02-14
Attending: OBSTETRICS & GYNECOLOGY | Admitting: OBSTETRICS & GYNECOLOGY
Payer: COMMERCIAL

## 2025-02-14 ENCOUNTER — APPOINTMENT (OUTPATIENT)
Dept: RADIOLOGY | Facility: HOSPITAL | Age: 40
End: 2025-02-14
Payer: COMMERCIAL

## 2025-02-14 ENCOUNTER — APPOINTMENT (OUTPATIENT)
Dept: OBSTETRICS AND GYNECOLOGY | Facility: CLINIC | Age: 40
End: 2025-02-14
Payer: COMMERCIAL

## 2025-02-14 ENCOUNTER — HOSPITAL ENCOUNTER (OUTPATIENT)
Facility: HOSPITAL | Age: 40
Discharge: HOME | End: 2025-02-14
Attending: OBSTETRICS & GYNECOLOGY | Admitting: OBSTETRICS & GYNECOLOGY
Payer: COMMERCIAL

## 2025-02-14 VITALS — WEIGHT: 216 LBS | SYSTOLIC BLOOD PRESSURE: 120 MMHG | DIASTOLIC BLOOD PRESSURE: 60 MMHG | BODY MASS INDEX: 33.83 KG/M2

## 2025-02-14 VITALS
HEIGHT: 67 IN | OXYGEN SATURATION: 99 % | HEART RATE: 83 BPM | WEIGHT: 216.93 LBS | BODY MASS INDEX: 34.05 KG/M2 | DIASTOLIC BLOOD PRESSURE: 72 MMHG | RESPIRATION RATE: 18 BRPM | SYSTOLIC BLOOD PRESSURE: 117 MMHG | TEMPERATURE: 97.2 F

## 2025-02-14 DIAGNOSIS — Z3A.32 32 WEEKS GESTATION OF PREGNANCY (HHS-HCC): Primary | ICD-10-CM

## 2025-02-14 DIAGNOSIS — J06.9 UPPER RESPIRATORY TRACT INFECTION, UNSPECIFIED TYPE: ICD-10-CM

## 2025-02-14 DIAGNOSIS — R05.8 POST-VIRAL COUGH SYNDROME: Primary | ICD-10-CM

## 2025-02-14 LAB
FLUAV RNA RESP QL NAA+PROBE: NOT DETECTED
FLUBV RNA RESP QL NAA+PROBE: NOT DETECTED
RSV RNA RESP QL NAA+PROBE: NOT DETECTED
SARS-COV-2 RNA RESP QL NAA+PROBE: NOT DETECTED

## 2025-02-14 PROCEDURE — 71046 X-RAY EXAM CHEST 2 VIEWS: CPT

## 2025-02-14 PROCEDURE — 0501F PRENATAL FLOW SHEET: CPT | Performed by: OBSTETRICS & GYNECOLOGY

## 2025-02-14 PROCEDURE — 87637 SARSCOV2&INF A&B&RSV AMP PRB: CPT | Performed by: STUDENT IN AN ORGANIZED HEALTH CARE EDUCATION/TRAINING PROGRAM

## 2025-02-14 PROCEDURE — 59025 FETAL NON-STRESS TEST: CPT | Performed by: STUDENT IN AN ORGANIZED HEALTH CARE EDUCATION/TRAINING PROGRAM

## 2025-02-14 PROCEDURE — 2500000004 HC RX 250 GENERAL PHARMACY W/ HCPCS (ALT 636 FOR OP/ED): Performed by: STUDENT IN AN ORGANIZED HEALTH CARE EDUCATION/TRAINING PROGRAM

## 2025-02-14 PROCEDURE — 71045 X-RAY EXAM CHEST 1 VIEW: CPT | Performed by: RADIOLOGY

## 2025-02-14 PROCEDURE — 87632 RESP VIRUS 6-11 TARGETS: CPT | Mod: CCI | Performed by: STUDENT IN AN ORGANIZED HEALTH CARE EDUCATION/TRAINING PROGRAM

## 2025-02-14 RX ORDER — LIDOCAINE HYDROCHLORIDE 10 MG/ML
0.5 INJECTION, SOLUTION INFILTRATION; PERINEURAL ONCE AS NEEDED
Status: DISCONTINUED | OUTPATIENT
Start: 2025-02-14 | End: 2025-02-14 | Stop reason: HOSPADM

## 2025-02-14 RX ORDER — GUAIFENESIN 600 MG/1
1200 TABLET, EXTENDED RELEASE ORAL 2 TIMES DAILY
Qty: 56 TABLET | Refills: 0 | Status: SHIPPED | OUTPATIENT
Start: 2025-02-14 | End: 2025-02-28

## 2025-02-14 RX ORDER — ONDANSETRON 4 MG/1
4 TABLET, FILM COATED ORAL EVERY 6 HOURS PRN
Status: DISCONTINUED | OUTPATIENT
Start: 2025-02-14 | End: 2025-02-14 | Stop reason: HOSPADM

## 2025-02-14 RX ORDER — DEXTROMETHORPHAN POLISTIREX 30 MG/5ML
30 SUSPENSION ORAL EVERY 12 HOURS PRN
Qty: 89 ML | Refills: 0 | Status: SHIPPED | OUTPATIENT
Start: 2025-02-14

## 2025-02-14 RX ORDER — GUAIFENESIN 600 MG/1
1200 TABLET, EXTENDED RELEASE ORAL ONCE
Status: COMPLETED | OUTPATIENT
Start: 2025-02-14 | End: 2025-02-14

## 2025-02-14 RX ORDER — DEXTROMETHORPHAN POLISTIREX 30 MG/5ML
30 SUSPENSION ORAL ONCE
Status: DISCONTINUED | OUTPATIENT
Start: 2025-02-14 | End: 2025-02-14 | Stop reason: HOSPADM

## 2025-02-14 RX ORDER — NIFEDIPINE 10 MG/1
10 CAPSULE ORAL ONCE AS NEEDED
Status: DISCONTINUED | OUTPATIENT
Start: 2025-02-14 | End: 2025-02-14 | Stop reason: HOSPADM

## 2025-02-14 RX ORDER — HYDRALAZINE HYDROCHLORIDE 20 MG/ML
5 INJECTION INTRAMUSCULAR; INTRAVENOUS ONCE AS NEEDED
Status: DISCONTINUED | OUTPATIENT
Start: 2025-02-14 | End: 2025-02-14 | Stop reason: HOSPADM

## 2025-02-14 RX ORDER — LABETALOL HYDROCHLORIDE 5 MG/ML
20 INJECTION, SOLUTION INTRAVENOUS ONCE AS NEEDED
Status: DISCONTINUED | OUTPATIENT
Start: 2025-02-14 | End: 2025-02-14 | Stop reason: HOSPADM

## 2025-02-14 RX ORDER — ONDANSETRON HYDROCHLORIDE 2 MG/ML
4 INJECTION, SOLUTION INTRAVENOUS EVERY 6 HOURS PRN
Status: DISCONTINUED | OUTPATIENT
Start: 2025-02-14 | End: 2025-02-14 | Stop reason: HOSPADM

## 2025-02-14 RX ADMIN — GUAIFENESIN 1200 MG: 600 TABLET ORAL at 14:59

## 2025-02-14 SDOH — SOCIAL STABILITY: SOCIAL INSECURITY: ARE YOU OR HAVE YOU BEEN THREATENED OR ABUSED PHYSICALLY, EMOTIONALLY, OR SEXUALLY BY ANYONE?: NO

## 2025-02-14 SDOH — SOCIAL STABILITY: SOCIAL INSECURITY: ARE THERE ANY APPARENT SIGNS OF INJURIES/BEHAVIORS THAT COULD BE RELATED TO ABUSE/NEGLECT?: NO

## 2025-02-14 SDOH — SOCIAL STABILITY: SOCIAL INSECURITY: VERBAL ABUSE: DENIES

## 2025-02-14 SDOH — SOCIAL STABILITY: SOCIAL INSECURITY: PHYSICAL ABUSE: DENIES

## 2025-02-14 SDOH — HEALTH STABILITY: MENTAL HEALTH: WERE YOU ABLE TO COMPLETE ALL THE BEHAVIORAL HEALTH SCREENINGS?: YES

## 2025-02-14 SDOH — HEALTH STABILITY: MENTAL HEALTH: NON-SPECIFIC ACTIVE SUICIDAL THOUGHTS (PAST 1 MONTH): NO

## 2025-02-14 SDOH — SOCIAL STABILITY: SOCIAL INSECURITY: HAVE YOU HAD THOUGHTS OF HARMING ANYONE ELSE?: NO

## 2025-02-14 SDOH — ECONOMIC STABILITY: HOUSING INSECURITY: DO YOU FEEL UNSAFE GOING BACK TO THE PLACE WHERE YOU ARE LIVING?: NO

## 2025-02-14 SDOH — HEALTH STABILITY: MENTAL HEALTH: HAVE YOU USED ANY SUBSTANCES (CANABIS, COCAINE, HEROIN, HALLUCINOGENS, INHALANTS, ETC.) IN THE PAST 12 MONTHS?: NO

## 2025-02-14 SDOH — HEALTH STABILITY: MENTAL HEALTH: SUICIDAL BEHAVIOR (LIFETIME): NO

## 2025-02-14 SDOH — SOCIAL STABILITY: SOCIAL INSECURITY: HAS ANYONE EVER THREATENED TO HURT YOUR FAMILY OR YOUR PETS?: NO

## 2025-02-14 SDOH — SOCIAL STABILITY: SOCIAL INSECURITY: ABUSE SCREEN: ADULT

## 2025-02-14 SDOH — SOCIAL STABILITY: SOCIAL INSECURITY: DO YOU FEEL ANYONE HAS EXPLOITED OR TAKEN ADVANTAGE OF YOU FINANCIALLY OR OF YOUR PERSONAL PROPERTY?: NO

## 2025-02-14 SDOH — HEALTH STABILITY: MENTAL HEALTH: WISH TO BE DEAD (PAST 1 MONTH): NO

## 2025-02-14 SDOH — HEALTH STABILITY: MENTAL HEALTH: HAVE YOU USED ANY PRESCRIPTION DRUGS OTHER THAN PRESCRIBED IN THE PAST 12 MONTHS?: NO

## 2025-02-14 SDOH — SOCIAL STABILITY: SOCIAL INSECURITY: DOES ANYONE TRY TO KEEP YOU FROM HAVING/CONTACTING OTHER FRIENDS OR DOING THINGS OUTSIDE YOUR HOME?: NO

## 2025-02-14 ASSESSMENT — PATIENT HEALTH QUESTIONNAIRE - PHQ9
2. FEELING DOWN, DEPRESSED OR HOPELESS: NOT AT ALL
SUM OF ALL RESPONSES TO PHQ9 QUESTIONS 1 & 2: 0
1. LITTLE INTEREST OR PLEASURE IN DOING THINGS: NOT AT ALL

## 2025-02-14 ASSESSMENT — LIFESTYLE VARIABLES
AUDIT-C TOTAL SCORE: 0
SKIP TO QUESTIONS 9-10: 1
HOW MANY STANDARD DRINKS CONTAINING ALCOHOL DO YOU HAVE ON A TYPICAL DAY: PATIENT DOES NOT DRINK
HOW OFTEN DO YOU HAVE 6 OR MORE DRINKS ON ONE OCCASION: NEVER
HOW OFTEN DO YOU HAVE A DRINK CONTAINING ALCOHOL: NEVER
AUDIT-C TOTAL SCORE: 0

## 2025-02-14 ASSESSMENT — PAIN SCALES - GENERAL: PAINLEVEL_OUTOF10: 0 - NO PAIN

## 2025-02-14 NOTE — H&P
OB Triage H&P    Assessment/Plan    Pat Rg is a 39 y.o.  at 32w3d, KODAK: 2025, by Ultrasound, who presents to triage with persistent cough.    Plan      Cough  -2-3 weeks of persistent cough, no other URI symptoms or fever  -AF/VSS in triage  -CXR significant for indeterminate R lower lobe opacities. Recommend follow up repeat CXR in 2-3 weeks if symptoms persist  -COVID/Flu/RSV negative, respiratory viral panel pending, will call if abnormal  -Overall low clinical suspicion for pneumonia, no signs/symptoms of sepsis  -Return precautions reviewed  -Message sent to outpatient provider to repeat CXR if symptoms persist  -Prescriptions sent for symptomatic relief; Mucinex and Delsym    Maternal Well-being  - Vital signs stable and WNL  - Emotional support and reassurance provided  - All questions and concerns addressed    IUP @ 32w3d  - prenatal lab work reviewed and WNL  - NST reactive  - continue routine prenatal care     Dispo  -Patient appropriate for discharge home, agrees with plan  -Return precautions discussed   -Follow up at next scheduled OB appointment or to triage sooner as needed    Discussed plan and reviewed with: Geeta Nelson PA-C and Dr. Janette Najera PA-S    Pregnancy Problems (from 24 to present)       Problem Noted Diagnosed Resolved    Pregnancy headache in second trimester ([facility]) 2024 by Paulina Lr MD  No    Priority:  Medium       Overview Addendum 2024  5:01 PM by Paulina Lr MD     Uses tylenol and periactin as needed  Improved, less frequent         AMA (advanced maternal age) multigravida 35+, second trimester ([facility]) 2024 by Paulina Lr MD  No    Priority:  Medium       Overview Addendum 10/11/2024  1:04 PM by Paulina Lr MD     Rr cfDNA  Hgb A1c 5.2         32 weeks gestation of pregnancy ([facility]) 2024 by Paulina Lr MD  No    Priority:  Medium       Overview Addendum  2025  4:57 PM by Paulina Lr MD     Desired provider in labor: [] CNM  [x] Physician  [x] Blood Products: [x] Yes, accepts [] No, needs counseling  [x] Initial BMI: 29  [x] Prenatal Labs: Rh+, RI, Hgb 12.7  [x] Cervical Cancer Screening up to date, NILM/neg HPV 3/2023  [x] Rh status: positive  [x] Genetic Screening:  rr cfDNA  [x] NT US: (11-13 wks): normal  [x] Baby ASA (if indicated): Not indicated  [x] Pregnancy dated by: 11 week ultrasound (cycles irregular)    [x] Anatomy US: (19-20 wks): normal  [x] Federal Sterilization consent signed (if indicated): Declines BTL  [x] 1hr GCT at 24-28wks: normal, 104  [x] Rhogam (if indicated): Not indicated  [x] Fetal Surveillance (if indicated): Not indicated at this time  [] Tdap (27-32 wks, may be given up to 36 wks if initial window missed): deferred  due to not feeling well, accepts at [place]  [x] RSV (32-36 wks) (Sept. to end ): N/A  [x] Flu Vaccine: Given 24    [x] Breastfeeding: plans to bf, need new pump  [x] Postpartum Birth control method: Discussed, considering ParaGard IUD at 6 week PP visit  [] GBS at 36 - 37 wks:  [] 39 weeks discussion of IOL vs. Expectant management:  [x] Mode of delivery ( anticipated ):            Supervision of high-risk pregnancy, second trimester ([facility]) 2024 by Paulina Lr MD  1/3/2025 by Paulina Lr MD            Subjective   Pat Rg is a 39 y.o.  at 32w3d by 11w3d US who presents to triage for worsening URI/cough. Patient states she has been sick since the new year and has had a progressive cough for the past 2 weeks. She reports a history of an ear infection, with subsequent abx treatment, and as she was nearing the end of the course of abx, the cough started. Patient states that if she takes a deep breath, she will go into a coughing fit. She vomited once last night due to the severity of her cough, and this is the only time it has happened. She has two young kids  at home with runny noses, but denies any other sick contacts. Has not taken anything for cough at home. No history of asthma, occasional GERD this pregnancy. Good fetal movement.  Denies vaginal bleeding., Denies contractions., Denies leaking of fluid.      Prenatal Provider Dr. Lr    OB History    Para Term  AB Living   3 2 2 0 0 2   SAB IAB Ectopic Multiple Live Births   0 0 0 0 2      # Outcome Date GA Lbr Sha/2nd Weight Sex Type Anes PTL Lv   3 Current            2 Term 2019 41w6d  4.082 kg M Vag-Spont  N MAKSIM   1 Term 2017 40w3d  4.111 kg M Vag-Spont   MAKSIM       No past surgical history on file.    Social History     Tobacco Use    Smoking status: Never    Smokeless tobacco: Never   Substance Use Topics    Alcohol use: Not Currently     Alcohol/week: 2.0 standard drinks of alcohol     Types: 2 Glasses of wine per week     Comment: Not currently       Allergies   Allergen Reactions    Tree Nut Anaphylaxis       Medications Prior to Admission   Medication Sig Dispense Refill Last Dose/Taking    PNV no.95/ferrous fum/folic ac (PRENATAL ORAL) Take by mouth.   2025    EPINEPHrine (Auvi-Q) 0.3 mg/0.3 mL injection syringe Inject 0.3 mL (0.3 mg) into the muscle 1 time if needed for anaphylaxis for up to 1 dose. Inject into upper leg. Call 911 after use. 1 each 0      Objective     Last Vitals  Temp Pulse Resp BP MAP O2 Sat   36.3 °C (97.3 °F) 93 18 132/63 90 97 %     Blood Pressures         2025  1212             BP: 132/63             Physical Exam  General: NAD, mood appropriate  Cardio: warm and well perfused  Pulmonary: mild wheezing in R lower base, scattered rhonchi throughout   Abdomen: Gravid, non-tender  Extremities: Symmetric       Fetal Monitoring  Baseline: 135 bpm, Variability: moderate,  Accelerations: present and Decelerations: none  Uterine Activity: No contractions seen on toco  Interpretation: Reactive    Labs in chart were reviewed.

## 2025-02-14 NOTE — PROGRESS NOTES
Ob Follow-up  25     SUBJECTIVE      HPI: Pat Rg is a 39 y.o.  at 32w3d here for RPNV.  She has no contractions, bleeding, or LOF. Reports normal fetal movement. Patient has been sick on and off for over 1 month. She has had a severe cough that does not seem to be improving after about 2 weeks. Congestion has improved.     OBJECTIVE  Visit Vitals  /60   Wt 98 kg (216 lb)   LMP 2024   BMI 33.83 kg/m²   OB Status Pregnant   Smoking Status Never   BSA 2.15 m²            ASSESSMENT & PLAN    Pat Rg is a 39 y.o.  at 32w3d here for the following concerns we addressed today:    Problem List Items Addressed This Visit          Pregnancy    32 weeks gestation of pregnancy ([facility]) - Primary    Overview     Desired provider in labor: [] CNM  [x] Physician  [x] Blood Products: [x] Yes, accepts [] No, needs counseling  [x] Initial BMI: 29  [x] Prenatal Labs: Rh+, RI, Hgb 12.7  [x] Cervical Cancer Screening up to date, NILM/neg HPV 3/2023  [x] Rh status: positive  [x] Genetic Screening:  rr cfDNA  [x] NT US: (11-13 wks): normal  [x] Baby ASA (if indicated): Not indicated  [x] Pregnancy dated by: 11 week ultrasound (cycles irregular)    [x] Anatomy US: (19-20 wks): normal  [x] Federal Sterilization consent signed (if indicated): Declines BTL  [x] 1hr GCT at 24-28wks: normal, 104  [x] Rhogam (if indicated): Not indicated  [x] Fetal Surveillance (if indicated): Not indicated at this time  [] Tdap (27-32 wks, may be given up to 36 wks if initial window missed): deferred  due to not feeling well, accepts at [place]  [x] RSV (32-36 wks) (Sept. to end of ): N/A  [x] Flu Vaccine: Given 24    [x] Breastfeeding: plans to bf, need new pump  [x] Postpartum Birth control method: Discussed, considering ParaGard IUD at 6 week PP visit  [] GBS at 36 - 37 wks:  [] 39 weeks discussion of IOL vs. Expectant management:  [x] Mode of delivery ( anticipated ):                 No orders of the  defined types were placed in this encounter.     Sent to L&D for for further evaluation to rule out pneumonia    RTC in 2 weeks      Paulina Lr MD

## 2025-02-19 LAB
ADENOVIRUS RVP, VIRC: NOT DETECTED
ENTEROVIRUS/RHINOVIRUS RVP, VIRC: NOT DETECTED
HUMAN BOCAVIRUS RVP, VIRC: NOT DETECTED
HUMAN CORONAVIRUS RVP, VIRC: NOT DETECTED
INFLUENZA A , VIRC: NOT DETECTED
INFLUENZA A H1N1-09 , VIRC: NOT DETECTED
INFLUENZA B PCR, VIRC: NOT DETECTED
METAPNEUMOVIRUS , VIRC: NOT DETECTED
PARAINFLUENZA PCR, VIRC: NOT DETECTED
RSV PCR, RVP, VIRC: NOT DETECTED

## 2025-02-20 ENCOUNTER — TELEPHONE (OUTPATIENT)
Dept: OBSTETRICS AND GYNECOLOGY | Facility: CLINIC | Age: 40
End: 2025-02-20
Payer: COMMERCIAL

## 2025-02-20 DIAGNOSIS — J18.9 COMMUNITY ACQUIRED PNEUMONIA OF RIGHT LUNG, UNSPECIFIED PART OF LUNG: ICD-10-CM

## 2025-02-20 DIAGNOSIS — J18.9 COMMUNITY ACQUIRED PNEUMONIA OF RIGHT LUNG, UNSPECIFIED PART OF LUNG: Primary | ICD-10-CM

## 2025-02-20 DIAGNOSIS — R05.2 SUBACUTE COUGH: Primary | ICD-10-CM

## 2025-02-20 DIAGNOSIS — R05.2 SUBACUTE COUGH: ICD-10-CM

## 2025-02-20 RX ORDER — AMOXICILLIN 500 MG/1
1000 TABLET, FILM COATED ORAL 3 TIMES DAILY
Qty: 6 TABLET | Refills: 0 | Status: SHIPPED | OUTPATIENT
Start: 2025-02-20 | End: 2025-02-20 | Stop reason: SDUPTHER

## 2025-02-20 RX ORDER — AMOXICILLIN 500 MG/1
1000 TABLET, FILM COATED ORAL 3 TIMES DAILY
Qty: 30 TABLET | Refills: 0 | Status: SHIPPED | OUTPATIENT
Start: 2025-02-20 | End: 2025-02-25

## 2025-02-20 RX ORDER — ALBUTEROL SULFATE 90 UG/1
2 INHALANT RESPIRATORY (INHALATION) EVERY 6 HOURS PRN
Qty: 18 G | Refills: 11 | Status: SHIPPED | OUTPATIENT
Start: 2025-02-20 | End: 2026-02-20

## 2025-02-20 RX ORDER — ALBUTEROL SULFATE 90 UG/1
INHALANT RESPIRATORY (INHALATION)
Qty: 8.5 G | Refills: 1 | Status: SHIPPED | OUTPATIENT
Start: 2025-02-20

## 2025-02-20 RX ORDER — AMOXICILLIN 500 MG/1
1000 TABLET, FILM COATED ORAL 3 TIMES DAILY
Qty: 30 TABLET | Refills: 0 | Status: SHIPPED | OUTPATIENT
Start: 2025-02-20 | End: 2025-02-20 | Stop reason: SDUPTHER

## 2025-02-28 ENCOUNTER — APPOINTMENT (OUTPATIENT)
Dept: OBSTETRICS AND GYNECOLOGY | Facility: CLINIC | Age: 40
End: 2025-02-28
Payer: COMMERCIAL

## 2025-03-03 ENCOUNTER — APPOINTMENT (OUTPATIENT)
Dept: OBSTETRICS AND GYNECOLOGY | Facility: CLINIC | Age: 40
End: 2025-03-03
Payer: COMMERCIAL

## 2025-03-03 VITALS — DIASTOLIC BLOOD PRESSURE: 60 MMHG | BODY MASS INDEX: 34.46 KG/M2 | SYSTOLIC BLOOD PRESSURE: 120 MMHG | WEIGHT: 220 LBS

## 2025-03-03 DIAGNOSIS — Z23 NEED FOR TDAP VACCINATION: Primary | ICD-10-CM

## 2025-03-03 DIAGNOSIS — J06.9 UPPER RESPIRATORY TRACT INFECTION, UNSPECIFIED TYPE: ICD-10-CM

## 2025-03-03 DIAGNOSIS — Z3A.34 34 WEEKS GESTATION OF PREGNANCY (HHS-HCC): ICD-10-CM

## 2025-03-03 PROCEDURE — 0501F PRENATAL FLOW SHEET: CPT | Performed by: OBSTETRICS & GYNECOLOGY

## 2025-03-03 PROCEDURE — 90471 IMMUNIZATION ADMIN: CPT | Performed by: OBSTETRICS & GYNECOLOGY

## 2025-03-03 PROCEDURE — 90715 TDAP VACCINE 7 YRS/> IM: CPT | Performed by: OBSTETRICS & GYNECOLOGY

## 2025-03-03 NOTE — ASSESSMENT & PLAN NOTE
Senders for peds  Prefers spontaneous labor but nervous about history of precipitous delivery (3 hours from SROM to delivery)  In requires IOL, desires to try AROM without pitocin first

## 2025-03-03 NOTE — PROGRESS NOTES
Ob Follow-up  25     SUBJECTIVE    HPI: Pat Rg is a 39 y.o.  at 34w6d here for RPNV.  She has no contractions, bleeding, or LOF. Reports normal fetal movement. Feeling back to normal after her URI       OBJECTIVE  Visit Vitals  /60   Wt 99.8 kg (220 lb)   LMP 2024   BMI 34.46 kg/m²   OB Status Pregnant   Smoking Status Never   BSA 2.17 m²            ASSESSMENT & PLAN    Pat Rg is a 39 y.o.  at 34w6d here for the following concerns we addressed today:    Problem List Items Addressed This Visit          Pregnancy    34 weeks gestation of pregnancy ([facility])    Overview     Desired provider in labor: [] CNM  [x] Physician  [x] Blood Products: [x] Yes, accepts [] No, needs counseling  [x] Initial BMI: 29  [x] Prenatal Labs: Rh+, RI, Hgb 12.7  [x] Cervical Cancer Screening up to date, NILM/neg HPV 3/2023  [x] Rh status: positive  [x] Genetic Screening:  rr cfDNA  [x] NT US: (11-13 wks): normal  [x] Baby ASA (if indicated): Not indicated  [x] Pregnancy dated by: 11 week ultrasound (cycles irregular)    [x] Anatomy US: (19-20 wks): normal  [x] Federal Sterilization consent signed (if indicated): Declines BTL  [x] 1hr GCT at 24-28wks: normal, 104  [x] Rhogam (if indicated): Not indicated  [x] Fetal Surveillance (if indicated): Not indicated at this time  [x] Tdap (27-32 wks, may be given up to 36 wks if initial window missed): given 3/3/25  [x] RSV (32-36 wks) (Sept. to end of ): N/A  [x] Flu Vaccine: Given 24    [x] Breastfeeding: plans to bf, need new pump  [x] Postpartum Birth control method: Discussed, considering ParaGard IUD at 6 week PP visit  [] GBS at 36 - 37 wks:  [] 39 weeks discussion of IOL vs. Expectant management: prefers spontaneous labor, hx precipitous labor so considering IOL  [x] Mode of delivery ( anticipated ):            Current Assessment & Plan     [facility] for peds  Prefers spontaneous labor but nervous about history of precipitous delivery (3  hours from SROM to delivery)  In requires IOL, desires to try AROM without pitocin first            Other    Upper respiratory tract infection    Overview     Resolved, felt better after antibiotics          Other Visit Diagnoses       Need for Tdap vaccination    -  Primary    Relevant Orders    Tdap vaccine, age 7 years and older              Orders Placed This Encounter   Procedures    Tdap vaccine, age 7 years and older        RTC in 1 week      Paulina Lr MD

## 2025-03-10 ENCOUNTER — APPOINTMENT (OUTPATIENT)
Dept: OBSTETRICS AND GYNECOLOGY | Facility: CLINIC | Age: 40
End: 2025-03-10
Payer: COMMERCIAL

## 2025-03-10 VITALS — DIASTOLIC BLOOD PRESSURE: 70 MMHG | BODY MASS INDEX: 34.61 KG/M2 | SYSTOLIC BLOOD PRESSURE: 118 MMHG | WEIGHT: 221 LBS

## 2025-03-10 DIAGNOSIS — O09.522 AMA (ADVANCED MATERNAL AGE) MULTIGRAVIDA 35+, SECOND TRIMESTER (HHS-HCC): Primary | ICD-10-CM

## 2025-03-10 DIAGNOSIS — Z3A.35 35 WEEKS GESTATION OF PREGNANCY (HHS-HCC): ICD-10-CM

## 2025-03-10 PROBLEM — J06.9 UPPER RESPIRATORY TRACT INFECTION: Status: RESOLVED | Noted: 2025-02-05 | Resolved: 2025-03-10

## 2025-03-10 PROCEDURE — 0501F PRENATAL FLOW SHEET: CPT | Performed by: OBSTETRICS & GYNECOLOGY

## 2025-03-10 NOTE — PROGRESS NOTES
Ob Follow-up  03/10/25     SUBJECTIVE      HPI: Pat Rg is a 39 y.o.  at 35w6d here for RPNV.  She has no contractions, bleeding, or LOF. Reports normal fetal movement. Patient reports no concerns       OBJECTIVE  Visit Vitals  /70   Wt 100 kg (221 lb)   LMP 2024   BMI 34.61 kg/m²   OB Status Pregnant   Smoking Status Never   BSA 2.17 m²            ASSESSMENT & PLAN    Pat Rg is a 39 y.o.  at 35w6d here for the following concerns we addressed today:    Problem List Items Addressed This Visit          Pregnancy    AMA (advanced maternal age) multigravida 35+, second trimester ([facility]) - Primary    Overview     Rr cfDNA  Hgb A1c 5.2         35 weeks gestation of pregnancy ([facility])    Overview     Desired provider in labor: [] CNM  [x] Physician  [x] Blood Products: [x] Yes, accepts [] No, needs counseling  [x] Initial BMI: 29  [x] Prenatal Labs: Rh+, RI, Hgb 12.7  [x] Cervical Cancer Screening up to date, NILM/neg HPV 3/2023  [x] Rh status: positive  [x] Genetic Screening:  rr cfDNA  [x] NT US: (11-13 wks): normal  [x] Baby ASA (if indicated): Not indicated  [x] Pregnancy dated by: 11 week ultrasound (cycles irregular)    [x] Anatomy US: (19-20 wks): normal  [x] Federal Sterilization consent signed (if indicated): Declines BTL  [x] 1hr GCT at 24-28wks: normal, 104  [x] Rhogam (if indicated): Not indicated  [x] Fetal Surveillance (if indicated): Not indicated at this time  [x] Tdap (27-32 wks, may be given up to 36 wks if initial window missed): given 3/3/25  [x] RSV (32-36 wks) (Sept. to end of ): N/A  [x] Flu Vaccine: Given 24    [x] Breastfeeding: plans to bf, need new pump  [x] Postpartum Birth control method: Discussed, considering ParaGard IUD at 6 week PP visit  [] GBS at 36 - 37 wks:  [] 39 weeks discussion of IOL vs. Expectant management: prefers spontaneous labor, hx precipitous labor so considering IOL  [x] Mode of delivery ( anticipated ):            Current  Assessment & Plan     GBS collected today          Relevant Orders    Group B Streptococcus (GBS) Prenatal Screen, Culture         Orders Placed This Encounter   Procedures    Group B Streptococcus (GBS) Prenatal Screen, Culture     Order Specific Question:   Release result to [facility]     Answer:   Immediate [1]        RTC in 1 week      Paulina Lr MD

## 2025-03-13 LAB — GP B STREP SPEC QL CULT: NORMAL

## 2025-03-17 ENCOUNTER — APPOINTMENT (OUTPATIENT)
Dept: OBSTETRICS AND GYNECOLOGY | Facility: CLINIC | Age: 40
End: 2025-03-17
Payer: COMMERCIAL

## 2025-03-17 VITALS — BODY MASS INDEX: 34.77 KG/M2 | DIASTOLIC BLOOD PRESSURE: 60 MMHG | WEIGHT: 222 LBS | SYSTOLIC BLOOD PRESSURE: 116 MMHG

## 2025-03-17 DIAGNOSIS — O09.522 AMA (ADVANCED MATERNAL AGE) MULTIGRAVIDA 35+, SECOND TRIMESTER (HHS-HCC): Primary | ICD-10-CM

## 2025-03-17 DIAGNOSIS — Z3A.36 36 WEEKS GESTATION OF PREGNANCY (HHS-HCC): ICD-10-CM

## 2025-03-17 PROCEDURE — 0501F PRENATAL FLOW SHEET: CPT | Performed by: OBSTETRICS & GYNECOLOGY

## 2025-03-17 NOTE — PROGRESS NOTES
Ob Follow-up  25     SUBJECTIVE      HPI: Pat Rg is a 39 y.o.  at 36w6d here for RPNV.  She has rare contractions, no bleeding, or LOF. Reports normal fetal movement. Patient reports doing well       OBJECTIVE  Visit Vitals  /60   Wt 101 kg (222 lb)   LMP 2024   BMI 34.77 kg/m²   OB Status Pregnant   Smoking Status Never   BSA 2.19 m²            ASSESSMENT & PLAN    Pat Rg is a 39 y.o.  at 36w6d here for the following concerns we addressed today:    Problem List Items Addressed This Visit          Pregnancy    AMA (advanced maternal age) multigravida 35+, second trimester ([facility]) - Primary    Overview     Rr cfDNA  Hgb A1c 5.2         36 weeks gestation of pregnancy ([facility])    Overview     Desired provider in labor: [] CNM  [x] Physician  [x] Blood Products: [x] Yes, accepts [] No, needs counseling  [x] Initial BMI: 29  [x] Prenatal Labs: Rh+, RI, Hgb 12.7  [x] Cervical Cancer Screening up to date, NILM/neg HPV 3/2023  [x] Rh status: positive  [x] Genetic Screening:  rr cfDNA  [x] NT US: (11-13 wks): normal  [x] Baby ASA (if indicated): Not indicated  [x] Pregnancy dated by: 11 week ultrasound (cycles irregular)    [x] Anatomy US: (19-20 wks): normal  [x] Federal Sterilization consent signed (if indicated): Declines BTL  [x] 1hr GCT at 24-28wks: normal, 104  [x] Rhogam (if indicated): Not indicated  [x] Fetal Surveillance (if indicated): Not indicated at this time  [x] Tdap (27-32 wks, may be given up to 36 wks if initial window missed): given 3/3/25  [x] RSV (32-36 wks) (Sept. to end of ): N/A  [x] Flu Vaccine: Given 24    [x] Breastfeeding: plans to bf, need new pump  [x] Postpartum Birth control method: Discussed, considering ParaGard IUD at 6 week PP visit  [x] GBS at 36 - 37 wks: negative  [x] 39 weeks discussion of IOL vs. Expectant management: prefers spontaneous labor unless she gets past 40 weeks  [x] Mode of delivery ( anticipated ):   Plans to  delivery at [facility] in 1 week      Paulina Lr MD

## 2025-03-26 ENCOUNTER — APPOINTMENT (OUTPATIENT)
Dept: OBSTETRICS AND GYNECOLOGY | Facility: CLINIC | Age: 40
End: 2025-03-26
Payer: COMMERCIAL

## 2025-03-26 VITALS — SYSTOLIC BLOOD PRESSURE: 106 MMHG | BODY MASS INDEX: 35.24 KG/M2 | DIASTOLIC BLOOD PRESSURE: 68 MMHG | WEIGHT: 225 LBS

## 2025-03-26 DIAGNOSIS — O09.522 AMA (ADVANCED MATERNAL AGE) MULTIGRAVIDA 35+, SECOND TRIMESTER (HHS-HCC): Primary | ICD-10-CM

## 2025-03-26 DIAGNOSIS — Z3A.38 38 WEEKS GESTATION OF PREGNANCY (HHS-HCC): ICD-10-CM

## 2025-03-26 PROCEDURE — 0501F PRENATAL FLOW SHEET: CPT | Performed by: OBSTETRICS & GYNECOLOGY

## 2025-03-26 NOTE — ASSESSMENT & PLAN NOTE
- Up to date on care  - Reviewed pregnancy precautions including contractions, bleeding, leaking fluid, and decreased fetal movement

## 2025-03-26 NOTE — PROGRESS NOTES
SUBJECTIVE  Pat Rg is a 39 y.o.  at 38w1d with an estimated date of delivery of 2025, by Ultrasound who presents for a routine prenatal visit.    She denies loss of fluid, vaginal bleeding, regular contractions/cramping, and decreased fetal movement.    OBJECTIVE  Weight: 102 kg (225 lb)   Pregravid BMI: Could not be calculated  BP: 106/68  Fetal Heart Rate: 155      ASSESSMENT & PLAN    38 weeks gestation of pregnancy ([facility])  - Up to date on care  - Reviewed pregnancy precautions including contractions, bleeding, leaking fluid, and decreased fetal movement    Follow up in 1 week for return OB visit.    Nel Savage MD  Obstetrics & Gynecology  25    
No

## 2025-04-03 ENCOUNTER — APPOINTMENT (OUTPATIENT)
Dept: OBSTETRICS AND GYNECOLOGY | Facility: CLINIC | Age: 40
End: 2025-04-03
Payer: COMMERCIAL

## 2025-04-03 VITALS — DIASTOLIC BLOOD PRESSURE: 64 MMHG | WEIGHT: 224 LBS | BODY MASS INDEX: 35.08 KG/M2 | SYSTOLIC BLOOD PRESSURE: 118 MMHG

## 2025-04-03 DIAGNOSIS — Z3A.39 39 WEEKS GESTATION OF PREGNANCY (HHS-HCC): ICD-10-CM

## 2025-04-03 DIAGNOSIS — O09.523 AMA (ADVANCED MATERNAL AGE) MULTIGRAVIDA 35+, THIRD TRIMESTER (HHS-HCC): Primary | ICD-10-CM

## 2025-04-03 PROCEDURE — 0501F PRENATAL FLOW SHEET: CPT | Performed by: OBSTETRICS & GYNECOLOGY

## 2025-04-03 NOTE — PROGRESS NOTES
Ob Follow-up  25     SUBJECTIVE      HPI: Pat Rg is a 39 y.o.  at 39w2d here for RPNV.  She has irregular contractions, no bleeding, or LOF. Reports normal fetal movement.        OBJECTIVE  Visit Vitals  /64   Wt 102 kg (224 lb)   LMP 2024   BMI 35.08 kg/m²   OB Status Pregnant   Smoking Status Never   BSA 2.2 m²            ASSESSMENT & PLAN    Pat Rg is a 39 y.o.  at 39w2d here for the following concerns we addressed today:    Problem List Items Addressed This Visit          Pregnancy    AMA (advanced maternal age) multigravida 35+, third trimester ([facility]) - Primary    Overview     Rr cfDNA  Hgb A1c 5.2         39 weeks gestation of pregnancy ([facility])    Overview     Desired provider in labor: [] CNM  [x] Physician  [x] Blood Products: [x] Yes, accepts [] No, needs counseling  [x] Initial BMI: 29  [x] Prenatal Labs: Rh+, RI, Hgb 12.7  [x] Cervical Cancer Screening up to date, NILM/neg HPV 3/2023  [x] Rh status: positive  [x] Genetic Screening:  rr cfDNA  [x] NT US: (11-13 wks): normal  [x] Baby ASA (if indicated): Not indicated  [x] Pregnancy dated by: 11 week ultrasound (cycles irregular)    [x] Anatomy US: (19-20 wks): normal  [x] Federal Sterilization consent signed (if indicated): Declines BTL  [x] 1hr GCT at 24-28wks: normal, 104  [x] Rhogam (if indicated): Not indicated  [x] Fetal Surveillance (if indicated): Not indicated at this time  [x] Tdap (27-32 wks, may be given up to 36 wks if initial window missed): given 3/3/25  [x] RSV (32-36 wks) (Sept. to end of ): N/A  [x] Flu Vaccine: Given 24    [x] Breastfeeding: plans to bf, need new pump  [x] Postpartum Birth control method: Discussed, considering ParaGard IUD at 6 week PP visit  [x] GBS at 36 - 37 wks: negative  [x] 39 weeks discussion of IOL vs. Expectant management: prefers spontaneous labor unless she gets past 40 weeks  [x] Mode of delivery ( anticipated ):   Plans to delivery at [facility]                 No orders of the defined types were placed in this encounter.       RTC in 1 week      Paulina Lr MD

## 2025-04-08 ENCOUNTER — APPOINTMENT (OUTPATIENT)
Dept: OBSTETRICS AND GYNECOLOGY | Facility: CLINIC | Age: 40
End: 2025-04-08
Payer: COMMERCIAL

## 2025-04-08 VITALS — WEIGHT: 227 LBS | BODY MASS INDEX: 35.55 KG/M2 | SYSTOLIC BLOOD PRESSURE: 112 MMHG | DIASTOLIC BLOOD PRESSURE: 74 MMHG

## 2025-04-08 DIAGNOSIS — O09.523 AMA (ADVANCED MATERNAL AGE) MULTIGRAVIDA 35+, THIRD TRIMESTER (HHS-HCC): Primary | ICD-10-CM

## 2025-04-08 DIAGNOSIS — Z3A.40 40 WEEKS GESTATION OF PREGNANCY (HHS-HCC): ICD-10-CM

## 2025-04-08 PROCEDURE — 0501F PRENATAL FLOW SHEET: CPT | Performed by: OBSTETRICS & GYNECOLOGY

## 2025-04-08 NOTE — PROGRESS NOTES
Ob Follow-up  25     SUBJECTIVE    HPI: Pat Rg is a 39 y.o.  at 40w0d here for RPNV.  She has rare contractions, no bleeding, or LOF. Reports normal fetal movement. Patient reports no concerns       OBJECTIVE  Visit Vitals  /74   Wt 103 kg (227 lb)   LMP 2024   BMI 35.55 kg/m²   OB Status Pregnant   Smoking Status Never   BSA 2.21 m²        ASSESSMENT & PLAN    Pat Rg is a 39 y.o.  at 40w0d here for the following concerns we addressed today:    Problem List Items Addressed This Visit          Pregnancy    AMA (advanced maternal age) multigravida 35+, third trimester ([facility]) - Primary    Overview     Rr cfDNA  Hgb A1c 5.2         40 weeks gestation of pregnancy ([facility])    Overview     Desired provider in labor: [] CNM  [x] Physician  [x] Blood Products: [x] Yes, accepts [] No, needs counseling  [x] Initial BMI: 29  [x] Prenatal Labs: Rh+, RI, Hgb 12.7  [x] Cervical Cancer Screening up to date, NILM/neg HPV 3/2023  [x] Rh status: positive  [x] Genetic Screening:  rr cfDNA  [x] NT US: (11-13 wks): normal  [x] Baby ASA (if indicated): Not indicated  [x] Pregnancy dated by: 11 week ultrasound (cycles irregular)    [x] Anatomy US: (19-20 wks): normal  [x] Federal Sterilization consent signed (if indicated): Declines BTL  [x] 1hr GCT at 24-28wks: normal, 104  [x] Rhogam (if indicated): Not indicated  [x] Fetal Surveillance (if indicated): Not indicated at this time  [x] Tdap (27-32 wks, may be given up to 36 wks if initial window missed): given 3/3/25  [x] RSV (32-36 wks) (Sept. to end of ): N/A  [x] Flu Vaccine: Given 24    [x] Breastfeeding: plans to bf, need new pump  [x] Postpartum Birth control method: Discussed, considering ParaGard IUD at 6 week PP visit  [x] GBS at 36 - 37 wks: negative  [x] 39 weeks discussion of IOL vs. Expectant management: prefers spontaneous labor unless she gets past 40 weeks, accepts IOL on  at [facility] - requested  [x] Mode of  delivery ( anticipated ):   Plans to delivery at [facility]           Current Assessment & Plan     Membranes stripped today, 2cm dilated  Agreeable to IOL on  at [facility], requested              No orders of the defined types were placed in this encounter.     RTC in 1 week or sooner PRJOSE Lr MD

## 2025-04-10 DIAGNOSIS — Z3A.40 40 WEEKS GESTATION OF PREGNANCY (HHS-HCC): Primary | ICD-10-CM

## 2025-04-14 ENCOUNTER — APPOINTMENT (OUTPATIENT)
Dept: OBSTETRICS AND GYNECOLOGY | Facility: CLINIC | Age: 40
End: 2025-04-14
Payer: COMMERCIAL

## 2025-04-14 VITALS — SYSTOLIC BLOOD PRESSURE: 104 MMHG | DIASTOLIC BLOOD PRESSURE: 62 MMHG | BODY MASS INDEX: 35.87 KG/M2 | WEIGHT: 229 LBS

## 2025-04-14 DIAGNOSIS — Z3A.40 40 WEEKS GESTATION OF PREGNANCY (HHS-HCC): ICD-10-CM

## 2025-04-14 DIAGNOSIS — O09.523 AMA (ADVANCED MATERNAL AGE) MULTIGRAVIDA 35+, THIRD TRIMESTER (HHS-HCC): Primary | ICD-10-CM

## 2025-04-14 PROCEDURE — 59025 FETAL NON-STRESS TEST: CPT | Performed by: OBSTETRICS & GYNECOLOGY

## 2025-04-14 PROCEDURE — 0501F PRENATAL FLOW SHEET: CPT | Performed by: OBSTETRICS & GYNECOLOGY

## 2025-04-14 NOTE — PROGRESS NOTES
Ob Follow-up  25     SUBJECTIVE    HPI: Pat Rg is a 39 y.o.  at 40w6d here for RPNV.  She has irregular contractions, denies bleeding, or LOF. Reports normal fetal movement. Patient denies concerns.       OBJECTIVE  Visit Vitals  /62   Wt 104 kg (229 lb)   LMP 2024   BMI 35.87 kg/m²   OB Status Pregnant   Smoking Status Never   BSA 2.22 m²          ASSESSMENT & PLAN    Pat Rg is a 39 y.o.  at 40w6d here for the following concerns we addressed today:    Problem List Items Addressed This Visit          Pregnancy    AMA (advanced maternal age) multigravida 35+, third trimester ([facility]) - Primary    Overview     Rr cfDNA  Hgb A1c 5.2         40 weeks gestation of pregnancy ([facility])    Overview     Desired provider in labor: [] CNM  [x] Physician  [x] Blood Products: [x] Yes, accepts [] No, needs counseling  [x] Initial BMI: 29  [x] Prenatal Labs: Rh+, RI, Hgb 12.7  [x] Cervical Cancer Screening up to date, NILM/neg HPV 3/2023  [x] Rh status: positive  [x] Genetic Screening:  rr cfDNA  [x] NT US: (11-13 wks): normal  [x] Baby ASA (if indicated): Not indicated  [x] Pregnancy dated by: 11 week ultrasound (cycles irregular)    [x] Anatomy US: (19-20 wks): normal  [x] Federal Sterilization consent signed (if indicated): Declines BTL  [x] 1hr GCT at 24-28wks: normal, 104  [x] Rhogam (if indicated): Not indicated  [x] Fetal Surveillance (if indicated): Not indicated at this time  [x] Tdap (27-32 wks, may be given up to 36 wks if initial window missed): given 3/3/25  [x] RSV (32-36 wks) (Sept. to end of ): N/A  [x] Flu Vaccine: Given 24    [x] Breastfeeding: plans to bf, need new pump  [x] Postpartum Birth control method: Discussed, considering ParaGard IUD at 6 week PP visit  [x] GBS at 36 - 37 wks: negative  [x] 39 weeks discussion of IOL vs. Expectant management: prefers spontaneous labor unless she gets past 40 weeks, accepts IOL on  at [facility] - requested  [x] Mode of  delivery ( anticipated ):   Plans to delivery at [facility]                No orders of the defined types were placed in this encounter.    NST in office today for post dates. It was reactive     Follow up for scheduled IOL on  or sooner JOSY Lr MD

## 2025-04-14 NOTE — PROCEDURES
Pat Rg, a  at 40w6d with an KODAK of 2025, by Ultrasound, was seen at [facility] for a nonstress test.    Non-Stress Test   Baseline Fetal Heart Rate for Non-Stress Test: 130 BPM  Variability in Waveform for Non-Stress Test: Moderate  Accelerations in Non-Stress Test: Yes  Decelerations in Non-Stress Test: None  Contractions in Non-Stress Test: Irregular  Interpretation of Non-Stress Test   Interpretation of Non-Stress Test: Reactive

## 2025-04-16 ENCOUNTER — HOSPITAL ENCOUNTER (INPATIENT)
Facility: HOSPITAL | Age: 40
LOS: 1 days | Discharge: HOME | End: 2025-04-17
Attending: OBSTETRICS & GYNECOLOGY | Admitting: OBSTETRICS & GYNECOLOGY
Payer: COMMERCIAL

## 2025-04-16 PROBLEM — O42.90 AMNIOTIC FLUID LEAKING (HHS-HCC): Status: ACTIVE | Noted: 2025-04-16

## 2025-04-16 LAB
ABO GROUP (TYPE) IN BLOOD: NORMAL
ANTIBODY SCREEN: NORMAL
ERYTHROCYTE [DISTWIDTH] IN BLOOD BY AUTOMATED COUNT: 13.2 % (ref 11.5–14.5)
HCT VFR BLD AUTO: 36.6 % (ref 36–46)
HGB BLD-MCNC: 13.1 G/DL (ref 12–16)
MCH RBC QN AUTO: 30.7 PG (ref 26–34)
MCHC RBC AUTO-ENTMCNC: 35.8 G/DL (ref 32–36)
MCV RBC AUTO: 86 FL (ref 80–100)
NRBC BLD-RTO: 0 /100 WBCS (ref 0–0)
PLATELET # BLD AUTO: 184 X10*3/UL (ref 150–450)
RBC # BLD AUTO: 4.27 X10*6/UL (ref 4–5.2)
RH FACTOR (ANTIGEN D): NORMAL
TREPONEMA PALLIDUM IGG+IGM AB [PRESENCE] IN SERUM OR PLASMA BY IMMUNOASSAY: NONREACTIVE
WBC # BLD AUTO: 9.8 X10*3/UL (ref 4.4–11.3)

## 2025-04-16 PROCEDURE — 51701 INSERT BLADDER CATHETER: CPT

## 2025-04-16 PROCEDURE — 2500000005 HC RX 250 GENERAL PHARMACY W/O HCPCS: Performed by: ADVANCED PRACTICE MIDWIFE

## 2025-04-16 PROCEDURE — 7100000016 HC LABOR RECOVERY PER HOUR

## 2025-04-16 PROCEDURE — 86780 TREPONEMA PALLIDUM: CPT | Mod: AHULAB | Performed by: ADVANCED PRACTICE MIDWIFE

## 2025-04-16 PROCEDURE — 2500000004 HC RX 250 GENERAL PHARMACY W/ HCPCS (ALT 636 FOR OP/ED): Mod: JZ | Performed by: ADVANCED PRACTICE MIDWIFE

## 2025-04-16 PROCEDURE — 59400 OBSTETRICAL CARE: CPT | Performed by: ADVANCED PRACTICE MIDWIFE

## 2025-04-16 PROCEDURE — 85027 COMPLETE CBC AUTOMATED: CPT | Performed by: ADVANCED PRACTICE MIDWIFE

## 2025-04-16 PROCEDURE — 59409 OBSTETRICAL CARE: CPT | Performed by: ADVANCED PRACTICE MIDWIFE

## 2025-04-16 PROCEDURE — 1100000001 HC PRIVATE ROOM DAILY

## 2025-04-16 PROCEDURE — 86901 BLOOD TYPING SEROLOGIC RH(D): CPT | Performed by: ADVANCED PRACTICE MIDWIFE

## 2025-04-16 PROCEDURE — 36415 COLL VENOUS BLD VENIPUNCTURE: CPT | Performed by: ADVANCED PRACTICE MIDWIFE

## 2025-04-16 PROCEDURE — 0HQ9XZZ REPAIR PERINEUM SKIN, EXTERNAL APPROACH: ICD-10-PCS | Performed by: ADVANCED PRACTICE MIDWIFE

## 2025-04-16 PROCEDURE — 2500000001 HC RX 250 WO HCPCS SELF ADMINISTERED DRUGS (ALT 637 FOR MEDICARE OP): Performed by: ADVANCED PRACTICE MIDWIFE

## 2025-04-16 PROCEDURE — 7210000002 HC LABOR PER HOUR

## 2025-04-16 RX ORDER — HYDRALAZINE HYDROCHLORIDE 20 MG/ML
5 INJECTION INTRAMUSCULAR; INTRAVENOUS ONCE AS NEEDED
Status: DISCONTINUED | OUTPATIENT
Start: 2025-04-16 | End: 2025-04-16

## 2025-04-16 RX ORDER — TRANEXAMIC ACID 100 MG/ML
1000 INJECTION, SOLUTION INTRAVENOUS ONCE AS NEEDED
Status: DISCONTINUED | OUTPATIENT
Start: 2025-04-16 | End: 2025-04-17 | Stop reason: HOSPADM

## 2025-04-16 RX ORDER — OXYTOCIN/0.9 % SODIUM CHLORIDE 30/500 ML
60 PLASTIC BAG, INJECTION (ML) INTRAVENOUS ONCE AS NEEDED
Status: DISCONTINUED | OUTPATIENT
Start: 2025-04-16 | End: 2025-04-16

## 2025-04-16 RX ORDER — ONDANSETRON 4 MG/1
4 TABLET, FILM COATED ORAL EVERY 6 HOURS PRN
Status: DISCONTINUED | OUTPATIENT
Start: 2025-04-16 | End: 2025-04-17 | Stop reason: HOSPADM

## 2025-04-16 RX ORDER — TRANEXAMIC ACID 100 MG/ML
1000 INJECTION, SOLUTION INTRAVENOUS ONCE AS NEEDED
Status: DISCONTINUED | OUTPATIENT
Start: 2025-04-16 | End: 2025-04-16

## 2025-04-16 RX ORDER — MISOPROSTOL 200 UG/1
800 TABLET ORAL ONCE AS NEEDED
Status: DISCONTINUED | OUTPATIENT
Start: 2025-04-16 | End: 2025-04-17 | Stop reason: HOSPADM

## 2025-04-16 RX ORDER — CARBOPROST TROMETHAMINE 250 UG/ML
250 INJECTION, SOLUTION INTRAMUSCULAR ONCE AS NEEDED
Status: DISCONTINUED | OUTPATIENT
Start: 2025-04-16 | End: 2025-04-17 | Stop reason: HOSPADM

## 2025-04-16 RX ORDER — ONDANSETRON HYDROCHLORIDE 2 MG/ML
4 INJECTION, SOLUTION INTRAVENOUS EVERY 6 HOURS PRN
Status: DISCONTINUED | OUTPATIENT
Start: 2025-04-16 | End: 2025-04-17 | Stop reason: HOSPADM

## 2025-04-16 RX ORDER — MISOPROSTOL 200 UG/1
800 TABLET ORAL ONCE AS NEEDED
Status: DISCONTINUED | OUTPATIENT
Start: 2025-04-16 | End: 2025-04-16

## 2025-04-16 RX ORDER — OXYTOCIN/0.9 % SODIUM CHLORIDE 30/500 ML
60 PLASTIC BAG, INJECTION (ML) INTRAVENOUS ONCE AS NEEDED
Status: DISCONTINUED | OUTPATIENT
Start: 2025-04-16 | End: 2025-04-17 | Stop reason: HOSPADM

## 2025-04-16 RX ORDER — SODIUM CHLORIDE, SODIUM LACTATE, POTASSIUM CHLORIDE, CALCIUM CHLORIDE 600; 310; 30; 20 MG/100ML; MG/100ML; MG/100ML; MG/100ML
75 INJECTION, SOLUTION INTRAVENOUS CONTINUOUS
Status: DISCONTINUED | OUTPATIENT
Start: 2025-04-16 | End: 2025-04-16

## 2025-04-16 RX ORDER — DIPHENHYDRAMINE HYDROCHLORIDE 50 MG/ML
25 INJECTION, SOLUTION INTRAMUSCULAR; INTRAVENOUS EVERY 6 HOURS PRN
Status: DISCONTINUED | OUTPATIENT
Start: 2025-04-16 | End: 2025-04-17 | Stop reason: HOSPADM

## 2025-04-16 RX ORDER — OXYTOCIN 10 [USP'U]/ML
10 INJECTION, SOLUTION INTRAMUSCULAR; INTRAVENOUS ONCE AS NEEDED
Status: DISCONTINUED | OUTPATIENT
Start: 2025-04-16 | End: 2025-04-17 | Stop reason: HOSPADM

## 2025-04-16 RX ORDER — IBUPROFEN 600 MG/1
600 TABLET ORAL EVERY 6 HOURS
Status: DISCONTINUED | OUTPATIENT
Start: 2025-04-16 | End: 2025-04-17 | Stop reason: HOSPADM

## 2025-04-16 RX ORDER — ADHESIVE BANDAGE
10 BANDAGE TOPICAL
Status: DISCONTINUED | OUTPATIENT
Start: 2025-04-16 | End: 2025-04-17 | Stop reason: HOSPADM

## 2025-04-16 RX ORDER — POLYETHYLENE GLYCOL 3350 17 G/17G
17 POWDER, FOR SOLUTION ORAL 2 TIMES DAILY PRN
Status: DISCONTINUED | OUTPATIENT
Start: 2025-04-16 | End: 2025-04-17 | Stop reason: HOSPADM

## 2025-04-16 RX ORDER — SIMETHICONE 80 MG
80 TABLET,CHEWABLE ORAL 4 TIMES DAILY PRN
Status: DISCONTINUED | OUTPATIENT
Start: 2025-04-16 | End: 2025-04-17 | Stop reason: HOSPADM

## 2025-04-16 RX ORDER — LABETALOL HYDROCHLORIDE 5 MG/ML
20 INJECTION, SOLUTION INTRAVENOUS ONCE AS NEEDED
Status: DISCONTINUED | OUTPATIENT
Start: 2025-04-16 | End: 2025-04-16

## 2025-04-16 RX ORDER — CARBOPROST TROMETHAMINE 250 UG/ML
250 INJECTION, SOLUTION INTRAMUSCULAR ONCE AS NEEDED
Status: DISCONTINUED | OUTPATIENT
Start: 2025-04-16 | End: 2025-04-16

## 2025-04-16 RX ORDER — LOPERAMIDE HYDROCHLORIDE 2 MG/1
4 CAPSULE ORAL EVERY 2 HOUR PRN
Status: DISCONTINUED | OUTPATIENT
Start: 2025-04-16 | End: 2025-04-17 | Stop reason: HOSPADM

## 2025-04-16 RX ORDER — LIDOCAINE HYDROCHLORIDE 10 MG/ML
30 INJECTION, SOLUTION INFILTRATION; PERINEURAL ONCE AS NEEDED
Status: COMPLETED | OUTPATIENT
Start: 2025-04-16 | End: 2025-04-16

## 2025-04-16 RX ORDER — METHYLERGONOVINE MALEATE 0.2 MG/ML
0.2 INJECTION INTRAVENOUS ONCE AS NEEDED
Status: COMPLETED | OUTPATIENT
Start: 2025-04-16 | End: 2025-04-16

## 2025-04-16 RX ORDER — OXYTOCIN 10 [USP'U]/ML
10 INJECTION, SOLUTION INTRAMUSCULAR; INTRAVENOUS ONCE AS NEEDED
Status: DISCONTINUED | OUTPATIENT
Start: 2025-04-16 | End: 2025-04-16

## 2025-04-16 RX ORDER — METHYLERGONOVINE MALEATE 0.2 MG/ML
0.2 INJECTION INTRAVENOUS ONCE AS NEEDED
Status: DISCONTINUED | OUTPATIENT
Start: 2025-04-16 | End: 2025-04-17 | Stop reason: HOSPADM

## 2025-04-16 RX ORDER — HYDRALAZINE HYDROCHLORIDE 20 MG/ML
5 INJECTION INTRAMUSCULAR; INTRAVENOUS ONCE AS NEEDED
Status: DISCONTINUED | OUTPATIENT
Start: 2025-04-16 | End: 2025-04-17 | Stop reason: HOSPADM

## 2025-04-16 RX ORDER — TERBUTALINE SULFATE 1 MG/ML
0.25 INJECTION SUBCUTANEOUS ONCE AS NEEDED
Status: DISCONTINUED | OUTPATIENT
Start: 2025-04-16 | End: 2025-04-16

## 2025-04-16 RX ORDER — LOPERAMIDE HYDROCHLORIDE 2 MG/1
4 CAPSULE ORAL EVERY 2 HOUR PRN
Status: DISCONTINUED | OUTPATIENT
Start: 2025-04-16 | End: 2025-04-16

## 2025-04-16 RX ORDER — DIPHENHYDRAMINE HCL 25 MG
25 CAPSULE ORAL EVERY 6 HOURS PRN
Status: DISCONTINUED | OUTPATIENT
Start: 2025-04-16 | End: 2025-04-17 | Stop reason: HOSPADM

## 2025-04-16 RX ORDER — LABETALOL HYDROCHLORIDE 5 MG/ML
20 INJECTION, SOLUTION INTRAVENOUS ONCE AS NEEDED
Status: DISCONTINUED | OUTPATIENT
Start: 2025-04-16 | End: 2025-04-17 | Stop reason: HOSPADM

## 2025-04-16 RX ORDER — ONDANSETRON 4 MG/1
4 TABLET, FILM COATED ORAL EVERY 6 HOURS PRN
Status: DISCONTINUED | OUTPATIENT
Start: 2025-04-16 | End: 2025-04-16

## 2025-04-16 RX ORDER — ONDANSETRON HYDROCHLORIDE 2 MG/ML
4 INJECTION, SOLUTION INTRAVENOUS EVERY 6 HOURS PRN
Status: DISCONTINUED | OUTPATIENT
Start: 2025-04-16 | End: 2025-04-16

## 2025-04-16 RX ORDER — ACETAMINOPHEN 325 MG/1
975 TABLET ORAL EVERY 6 HOURS
Status: DISCONTINUED | OUTPATIENT
Start: 2025-04-16 | End: 2025-04-17 | Stop reason: HOSPADM

## 2025-04-16 RX ADMIN — ONDANSETRON 4 MG: 2 INJECTION INTRAMUSCULAR; INTRAVENOUS at 02:34

## 2025-04-16 RX ADMIN — SODIUM CHLORIDE, SODIUM LACTATE, POTASSIUM CHLORIDE, AND CALCIUM CHLORIDE 75 ML/HR: .6; .31; .03; .02 INJECTION, SOLUTION INTRAVENOUS at 02:40

## 2025-04-16 RX ADMIN — ACETAMINOPHEN 975 MG: 325 TABLET, FILM COATED ORAL at 22:28

## 2025-04-16 RX ADMIN — ACETAMINOPHEN 975 MG: 325 TABLET, FILM COATED ORAL at 16:19

## 2025-04-16 RX ADMIN — Medication 1 EACH: at 10:57

## 2025-04-16 RX ADMIN — IBUPROFEN 600 MG: 600 TABLET ORAL at 04:31

## 2025-04-16 RX ADMIN — ACETAMINOPHEN 975 MG: 325 TABLET, FILM COATED ORAL at 04:30

## 2025-04-16 RX ADMIN — LIDOCAINE HYDROCHLORIDE 30 ML: 10 INJECTION, SOLUTION INFILTRATION; PERINEURAL at 03:19

## 2025-04-16 RX ADMIN — BENZOCAINE AND LEVOMENTHOL 1 APPLICATION: 200; 5 SPRAY TOPICAL at 10:57

## 2025-04-16 RX ADMIN — METHYLERGONOVINE MALEATE 0.2 MG: 0.2 INJECTION, SOLUTION INTRAMUSCULAR; INTRAVENOUS at 03:13

## 2025-04-16 RX ADMIN — IBUPROFEN 600 MG: 600 TABLET ORAL at 10:53

## 2025-04-16 RX ADMIN — POLYETHYLENE GLYCOL 3350 17 G: 17 POWDER, FOR SOLUTION ORAL at 10:57

## 2025-04-16 RX ADMIN — ACETAMINOPHEN 975 MG: 325 TABLET, FILM COATED ORAL at 10:53

## 2025-04-16 RX ADMIN — IBUPROFEN 600 MG: 600 TABLET ORAL at 22:28

## 2025-04-16 RX ADMIN — IBUPROFEN 600 MG: 600 TABLET ORAL at 16:19

## 2025-04-16 SDOH — ECONOMIC STABILITY: FOOD INSECURITY: WITHIN THE PAST 12 MONTHS, THE FOOD YOU BOUGHT JUST DIDN'T LAST AND YOU DIDN'T HAVE MONEY TO GET MORE.: NEVER TRUE

## 2025-04-16 SDOH — HEALTH STABILITY: MENTAL HEALTH: HAVE YOU USED ANY SUBSTANCES (CANABIS, COCAINE, HEROIN, HALLUCINOGENS, INHALANTS, ETC.) IN THE PAST 12 MONTHS?: NO

## 2025-04-16 SDOH — SOCIAL STABILITY: SOCIAL INSECURITY: WITHIN THE LAST YEAR, HAVE YOU BEEN AFRAID OF YOUR PARTNER OR EX-PARTNER?: NO

## 2025-04-16 SDOH — ECONOMIC STABILITY: TRANSPORTATION INSECURITY: IN THE PAST 12 MONTHS, HAS LACK OF TRANSPORTATION KEPT YOU FROM MEDICAL APPOINTMENTS OR FROM GETTING MEDICATIONS?: NO

## 2025-04-16 SDOH — SOCIAL STABILITY: SOCIAL INSECURITY
WITHIN THE LAST YEAR, HAVE YOU BEEN KICKED, HIT, SLAPPED, OR OTHERWISE PHYSICALLY HURT BY YOUR PARTNER OR EX-PARTNER?: NO

## 2025-04-16 SDOH — HEALTH STABILITY: MENTAL HEALTH: WERE YOU ABLE TO COMPLETE ALL THE BEHAVIORAL HEALTH SCREENINGS?: YES

## 2025-04-16 SDOH — SOCIAL STABILITY: SOCIAL INSECURITY
WITHIN THE LAST YEAR, HAVE YOU BEEN RAPED OR FORCED TO HAVE ANY KIND OF SEXUAL ACTIVITY BY YOUR PARTNER OR EX-PARTNER?: NO

## 2025-04-16 SDOH — SOCIAL STABILITY: SOCIAL INSECURITY: WITHIN THE LAST YEAR, HAVE YOU BEEN HUMILIATED OR EMOTIONALLY ABUSED IN OTHER WAYS BY YOUR PARTNER OR EX-PARTNER?: NO

## 2025-04-16 SDOH — HEALTH STABILITY: MENTAL HEALTH: SUICIDAL BEHAVIOR (LIFETIME): NO

## 2025-04-16 SDOH — SOCIAL STABILITY: SOCIAL INSECURITY: HAS ANYONE EVER THREATENED TO HURT YOUR FAMILY OR YOUR PETS?: NO

## 2025-04-16 SDOH — SOCIAL STABILITY: SOCIAL INSECURITY: VERBAL ABUSE: DENIES

## 2025-04-16 SDOH — ECONOMIC STABILITY: FOOD INSECURITY: HOW HARD IS IT FOR YOU TO PAY FOR THE VERY BASICS LIKE FOOD, HOUSING, MEDICAL CARE, AND HEATING?: NOT HARD AT ALL

## 2025-04-16 SDOH — HEALTH STABILITY: MENTAL HEALTH: WISH TO BE DEAD (PAST 1 MONTH): NO

## 2025-04-16 SDOH — ECONOMIC STABILITY: FOOD INSECURITY: WITHIN THE PAST 12 MONTHS, YOU WORRIED THAT YOUR FOOD WOULD RUN OUT BEFORE YOU GOT THE MONEY TO BUY MORE.: NEVER TRUE

## 2025-04-16 SDOH — ECONOMIC STABILITY: HOUSING INSECURITY: DO YOU FEEL UNSAFE GOING BACK TO THE PLACE WHERE YOU ARE LIVING?: NO

## 2025-04-16 SDOH — SOCIAL STABILITY: SOCIAL INSECURITY: ARE YOU OR HAVE YOU BEEN THREATENED OR ABUSED PHYSICALLY, EMOTIONALLY, OR SEXUALLY BY ANYONE?: NO

## 2025-04-16 SDOH — HEALTH STABILITY: MENTAL HEALTH: NON-SPECIFIC ACTIVE SUICIDAL THOUGHTS (PAST 1 MONTH): NO

## 2025-04-16 SDOH — SOCIAL STABILITY: SOCIAL INSECURITY: DO YOU FEEL ANYONE HAS EXPLOITED OR TAKEN ADVANTAGE OF YOU FINANCIALLY OR OF YOUR PERSONAL PROPERTY?: NO

## 2025-04-16 SDOH — HEALTH STABILITY: MENTAL HEALTH: HAVE YOU USED ANY PRESCRIPTION DRUGS OTHER THAN PRESCRIBED IN THE PAST 12 MONTHS?: NO

## 2025-04-16 SDOH — SOCIAL STABILITY: SOCIAL INSECURITY: HAVE YOU HAD THOUGHTS OF HARMING ANYONE ELSE?: NO

## 2025-04-16 SDOH — SOCIAL STABILITY: SOCIAL INSECURITY: ARE THERE ANY APPARENT SIGNS OF INJURIES/BEHAVIORS THAT COULD BE RELATED TO ABUSE/NEGLECT?: NO

## 2025-04-16 SDOH — SOCIAL STABILITY: SOCIAL INSECURITY: ABUSE SCREEN: ADULT

## 2025-04-16 SDOH — SOCIAL STABILITY: SOCIAL INSECURITY: DOES ANYONE TRY TO KEEP YOU FROM HAVING/CONTACTING OTHER FRIENDS OR DOING THINGS OUTSIDE YOUR HOME?: NO

## 2025-04-16 SDOH — SOCIAL STABILITY: SOCIAL INSECURITY: HAVE YOU HAD ANY THOUGHTS OF HARMING ANYONE ELSE?: NO

## 2025-04-16 SDOH — SOCIAL STABILITY: SOCIAL INSECURITY: PHYSICAL ABUSE: DENIES

## 2025-04-16 ASSESSMENT — PAIN DESCRIPTION - DESCRIPTORS
DESCRIPTORS: CRAMPING

## 2025-04-16 ASSESSMENT — PAIN SCALES - GENERAL
PAINLEVEL_OUTOF10: 4
PAINLEVEL_OUTOF10: 4
PAINLEVEL_OUTOF10: 1
PAINLEVEL_OUTOF10: 2
PAINLEVEL_OUTOF10: 0 - NO PAIN
PAINLEVEL_OUTOF10: 0 - NO PAIN
PAINLEVEL_OUTOF10: 4
PAINLEVEL_OUTOF10: 7
PAINLEVEL_OUTOF10: 0 - NO PAIN

## 2025-04-16 ASSESSMENT — PATIENT HEALTH QUESTIONNAIRE - PHQ9
1. LITTLE INTEREST OR PLEASURE IN DOING THINGS: NOT AT ALL
2. FEELING DOWN, DEPRESSED OR HOPELESS: NOT AT ALL
SUM OF ALL RESPONSES TO PHQ9 QUESTIONS 1 & 2: 0

## 2025-04-16 ASSESSMENT — PAIN - FUNCTIONAL ASSESSMENT: PAIN_FUNCTIONAL_ASSESSMENT: 0-10

## 2025-04-16 ASSESSMENT — ACTIVITIES OF DAILY LIVING (ADL)
LACK_OF_TRANSPORTATION: NO
LACK_OF_TRANSPORTATION: NO

## 2025-04-16 ASSESSMENT — PAIN DESCRIPTION - LOCATION: LOCATION: ABDOMEN

## 2025-04-16 NOTE — H&P
OB Admission H&P    Assessment/Plan    Pat Rg is a 39 y.o.  at 41w1d, KODAK: 2025, by Ultrasound, who is admitted for Ruptured Membranes.    Plan  -Admit to L&D, consented on printed out form due to Epic downtime   -Routine labs; T&S, CBC, and Syphilis  - Labor support   -Recheck as clinically indicated by maternal or fetal status    Fetal Status  -NST reactive, reassuring   -Presentation vertex based on ultrasound  -EFW 9lb by leopolds  -GBS negative    Postpartum  Contraception Plan:  defer to office     Pregnancy Problems (from 24 to present)       Problem Noted Diagnosed Resolved    Amniotic fluid leaking ([facility]) 2025 by MIKKI Ventura-JENS  No    Priority:  Medium       Pregnancy headache in second trimester ([facility]) 2024 by Paulina Lr MD  No    Priority:  Medium       Overview Addendum 2024  5:01 PM by Paulina Lr MD   Uses tylenol and periactin as needed  Improved, less frequent         AMA (advanced maternal age) multigravida 35+, third trimester ([facility]) 2024 by Paulina Lr MD  No    Priority:  Medium       Overview Addendum 10/11/2024  1:04 PM by Paulina Lr MD   Rr cfDNA  Hgb A1c 5.2         40 weeks gestation of pregnancy ([facility]) 2024 by Paulina Lr MD  No    Priority:  Medium       Overview Addendum 2025  9:50 AM by Paulina Lr MD   Desired provider in labor: [] CNM  [x] Physician  [x] Blood Products: [x] Yes, accepts [] No, needs counseling  [x] Initial BMI: 29  [x] Prenatal Labs: Rh+, RI, Hgb 12.7  [x] Cervical Cancer Screening up to date, NILM/neg HPV 3/2023  [x] Rh status: positive  [x] Genetic Screening:  rr cfDNA  [x] NT US: (11-13 wks): normal  [x] Baby ASA (if indicated): Not indicated  [x] Pregnancy dated by: 11 week ultrasound (cycles irregular)    [x] Anatomy US: (19-20 wks): normal  [x] Federal Sterilization consent signed (if indicated): Declines BTL  [x] 1hr GCT at  24-28wks: normal, 104  [x] Rhogam (if indicated): Not indicated  [x] Fetal Surveillance (if indicated): Not indicated at this time  [x] Tdap (27-32 wks, may be given up to 36 wks if initial window missed): given 3/3/25  [x] RSV (32-36 wks) (Sept. to end of ): N/A  [x] Flu Vaccine: Given 24    [x] Breastfeeding: plans to bf  [x] Postpartum Birth control method: Discussed, considering ParaGard IUD at 6 week PP visit  [x] GBS at 36 - 37 wks: negative  [x] 39 weeks discussion of IOL vs. Expectant management: prefers spontaneous labor, scheduled for IOL at [facility] on  at 8am  [x] Mode of delivery ( anticipated ):   Plans to delivery at [facility]           Supervision of high-risk pregnancy, second trimester ([facility]) 2024 by Paulina Lr MD  1/3/2025 by Paulina Lr MD            Subjective   Woke up at 00:45 with pop and gush of fluid, contractions started 30 min after. Breathing through ctx.     OB History    Para Term  AB Living   3 2 2 0 0 2   SAB IAB Ectopic Multiple Live Births   0 0 0 0 2      # Outcome Date GA Lbr Sha/2nd Weight Sex Type Anes PTL Lv   3 Current            2 Term 2019 41w6d  4.082 kg M Vag-Spont  N MAKSIM   1 Term 2017 40w3d  4.111 kg M Vag-Spont   MAKSIM       Surgical History[1]    Social History     Tobacco Use    Smoking status: Never    Smokeless tobacco: Never   Substance Use Topics    Alcohol use: Not Currently     Alcohol/week: 2.0 standard drinks of alcohol     Types: 2 Glasses of wine per week     Comment: Not currently       Allergies[2]    Prescriptions Prior to Admission[3]  Objective     Last Vitals  Temp Pulse Resp BP MAP O2 Sat   36.7 °C (98.1 °F) 82 18 118/66 81 97 %     Blood Pressures         2025  0225             BP: 118/66             Physical Exam  General: NAD, mood appropriate  Cardiopulmonary: warm and well perfused, breathing comfortably on room air  Abdomen: Gravid, non-tender  Extremities: Symmetric  Speculum Exam:  deferred  Cervix 8/90/-2     Fetal Monitoring  Baseline: 140 bpm, Variability: moderate,  Accelerations: present and Decelerations: none  Uterine Activity: Contractions present and q2-3 minutes  Interpretation: Reactive    Bedside ultrasound: Yes    Labs in chart were reviewed.          Prenatal labs reviewed, not remarkable.             [1] No past surgical history on file.  [2]   Allergies  Allergen Reactions    Tree Nut Anaphylaxis   [3]   Medications Prior to Admission   Medication Sig Dispense Refill Last Dose/Taking    EPINEPHrine (Auvi-Q) 0.3 mg/0.3 mL injection syringe Inject 0.3 mL (0.3 mg) into the muscle 1 time if needed for anaphylaxis for up to 1 dose. Inject into upper leg. Call 911 after use. 1 each 0     PNV no.95/ferrous fum/folic ac (PRENATAL ORAL) Take by mouth.

## 2025-04-16 NOTE — CARE PLAN
Problem: Postpartum  Goal: Experiences normal postpartum course  Outcome: Progressing     Problem: Postpartum  Goal: Appropriate maternal -  bonding  Outcome: Progressing     Problem: Postpartum  Goal: Establish and maintain infant feeding pattern for adequate nutrition  Outcome: Progressing

## 2025-04-16 NOTE — L&D DELIVERY NOTE
OB Delivery Note  2025  Pat Rg  39 y.o.   Vaginal, Spontaneous       Gestational Age: 41w1d  /Para:   Quantitative Blood Loss: Admission to Discharge: 400 mL (2025  2:24 AM - 2025  3:47 AM)    Patient pushed with good effort in standing position. Head delivered, restituted to VALERIO. Shoulders delivered with gentle downward traction following restitution. Postpartum pitocin bolus initiated immediately after birth. Infant placed on maternal abdomen for skin to skin. Cord clamped and cut by FOB after 4 min delay. Placenta delivered spontaneously and with gentle downward traction.  Uterus boggy, clots expulsed from lower uterine segment, methergine IM given. Fundus then palpated firm, midline, and at umbilicus. Vagina, perineum, and labia inspected. 1st degree perineal laceration repaired with 3-0 vicryl rapide.  mL. Mom and baby stable.    MIKKI Ventura-Karla Harris [[id number]]      Labor Events    Rupture date/time: 2025  Rupture type: Spontaneous  Fluid color: Clear  Fluid odor: None  Labor type: Spontaneous Onset of Labor  Labor allowed to proceed with plans for an attempted vaginal birth?: Yes  Augmentation: None  Complications: None       Labor Event Times    Labor onset date/time: 2025  Dilation complete date/time: 2025  Start pushing date/time: 2025 02:54       Placenta    Placenta delivery date/time: 2025 03:08  Placenta removal: Spontaneous  Placenta appearance: Intact  Placenta disposition: discarded       Cord    Vessels: 3 vessels  Complications: Nuchal  Nuchal intervention: reduced  Nuchal cord description: loose nuchal cord  Number of loops: 1  Delayed cord clamping?: Yes  Cord clamped date/time: 2025 03:01:00  Cord blood disposition: Lab  Gases sent?: No  Stem cell collection (by provider): No       Lacerations    Episiotomy: None  Perineal laceration: 1st  Perineal laceration repaired?: Yes  Repair  suture: 3-0 Synthetic Suture       Anesthesia    Method: None  Analgesics: NITROUS OXIDE - OXYGEN THERAPY       Operative Delivery    Forceps attempted?: No  Vacuum extractor attempted?: No       Shoulder Dystocia    Shoulder dystocia present?: No        Delivery    Birth date/time: 2025 02:56:00  Delivery type: Vaginal, Spontaneous  Complications: None       Resuscitation    Method: Suctioning, Tactile stimulation       Apgars    Living status: Living  Apgar Component Scores:  1 min.:  5 min.:  10 min.:  15 min.:  20 min.:    Skin color:  0  1       Heart rate:  2  2       Reflex irritability:  2  2       Muscle tone:  2  2       Respiratory effort:  2  2       Total:  8  9       Apgars assigned by: MIGDALIA MCGRAW       Delivery Providers    Delivering clinician: JOSSUE Ventura   Provider Role    Lucille Shelton RN Delivery Nurse    Brenda Mcgraw, RN Nursery Nurse                     JOSSUE Ventura

## 2025-04-16 NOTE — CARE PLAN
The patient's goals for the shift include      The clinical goals for the shift include rest and bond with baby

## 2025-04-16 NOTE — LACTATION NOTE
Lactation Consultant Note  Lactation Consultation  Reason for Consult: Initial assessment  Consultant Name: Joslyn VALVERDE    Maternal Information  Has mother  before?: Yes  Infant to breast within first 2 hours of birth?: Yes  Exclusive Pump and Bottle Feed: No    Maternal Assessment  Breast Assessment: Large, Soft, Compressible  Nipple Assessment: Intact, Erect  Areola Assessment: Normal    Infant Assessment  Infant Behavior: Awake, Quiet alert  Infant Assessment:  (deferred)    Feeding Assessment  Nutrition Source: Breastmilk  Feeding Method: Nursing at the breast  Feeding Position: Football/seated  Suck/Feeding: Sustained, Audible swallowing  Latch Assessment: Eagerly grasped on to latch, Wide open mouth < 160, Sucks with long jaw movement    LATCH TOOL  Latch: Grasps breast, tongue down, lips flanged, rhythmic sucking  Audible Swallowing: A few with stimulation  Type of Nipple: Everted (After stimulation)  Comfort (Breast/Nipple): Soft/non-tender  Hold (Positioning): Minimal assist, teach one side, mother does other, staff holds  LATCH Score: 8    Breast Pump       Other OB Lactation Tools       Patient Follow-up  Inpatient Lactation Follow-up Needed :  (as needed)  Lactation Professional - OK to Discharge: Yes    Other OB Lactation Documentation       Recommendations/Summary  Observed mom latch baby in football hold to the right side. Baby opened wide to latch and once latched, sucked with long jaw movement with some swallows noted. Mom was asked to attempt/feed baby at least every 2-3 hours or on demand with a goal of 8-12 feeds daily. Feeding cues reviewed.Reviewed milk supply patterns and  feeding patterns in the first and second 24 HOL.Breastfeeding education reviewed and questions answered. Mom aware of lactation support and asked to call out for feed assistance and with questions as needed.

## 2025-04-17 ENCOUNTER — PHARMACY VISIT (OUTPATIENT)
Dept: PHARMACY | Facility: CLINIC | Age: 40
End: 2025-04-17
Payer: COMMERCIAL

## 2025-04-17 VITALS
TEMPERATURE: 97.7 F | DIASTOLIC BLOOD PRESSURE: 75 MMHG | SYSTOLIC BLOOD PRESSURE: 115 MMHG | WEIGHT: 231.48 LBS | HEART RATE: 61 BPM | OXYGEN SATURATION: 96 % | BODY MASS INDEX: 36.33 KG/M2 | HEIGHT: 67 IN | RESPIRATION RATE: 18 BRPM

## 2025-04-17 PROBLEM — O42.90 AMNIOTIC FLUID LEAKING (HHS-HCC): Status: RESOLVED | Noted: 2025-04-16 | Resolved: 2025-04-17

## 2025-04-17 PROBLEM — O26.892 PREGNANCY HEADACHE IN SECOND TRIMESTER (HHS-HCC): Status: RESOLVED | Noted: 2024-11-07 | Resolved: 2025-04-17

## 2025-04-17 PROBLEM — Z3A.40 40 WEEKS GESTATION OF PREGNANCY (HHS-HCC): Status: RESOLVED | Noted: 2024-09-13 | Resolved: 2025-04-17

## 2025-04-17 PROBLEM — R51.9 PREGNANCY HEADACHE IN SECOND TRIMESTER (HHS-HCC): Status: RESOLVED | Noted: 2024-11-07 | Resolved: 2025-04-17

## 2025-04-17 PROBLEM — O09.523 AMA (ADVANCED MATERNAL AGE) MULTIGRAVIDA 35+, THIRD TRIMESTER (HHS-HCC): Status: RESOLVED | Noted: 2024-09-13 | Resolved: 2025-04-17

## 2025-04-17 PROCEDURE — 2500000001 HC RX 250 WO HCPCS SELF ADMINISTERED DRUGS (ALT 637 FOR MEDICARE OP): Performed by: ADVANCED PRACTICE MIDWIFE

## 2025-04-17 PROCEDURE — 2500000005 HC RX 250 GENERAL PHARMACY W/O HCPCS: Performed by: ADVANCED PRACTICE MIDWIFE

## 2025-04-17 PROCEDURE — RXMED WILLOW AMBULATORY MEDICATION CHARGE

## 2025-04-17 RX ORDER — IBUPROFEN 600 MG/1
600 TABLET ORAL EVERY 6 HOURS PRN
Qty: 30 TABLET | Refills: 0 | Status: SHIPPED | OUTPATIENT
Start: 2025-04-17

## 2025-04-17 RX ORDER — ACETAMINOPHEN 500 MG
1000 TABLET ORAL EVERY 6 HOURS PRN
Qty: 60 TABLET | Refills: 0 | Status: SHIPPED | OUTPATIENT
Start: 2025-04-17

## 2025-04-17 RX ADMIN — BENZOCAINE AND LEVOMENTHOL 1 APPLICATION: 200; 5 SPRAY TOPICAL at 10:37

## 2025-04-17 RX ADMIN — ACETAMINOPHEN 975 MG: 325 TABLET, FILM COATED ORAL at 04:50

## 2025-04-17 RX ADMIN — IBUPROFEN 600 MG: 600 TABLET ORAL at 04:50

## 2025-04-17 RX ADMIN — ACETAMINOPHEN 975 MG: 325 TABLET, FILM COATED ORAL at 10:36

## 2025-04-17 RX ADMIN — IBUPROFEN 600 MG: 600 TABLET ORAL at 10:36

## 2025-04-17 RX ADMIN — Medication 1 EACH: at 09:59

## 2025-04-17 ASSESSMENT — PAIN SCALES - GENERAL
PAINLEVEL_OUTOF10: 3
PAINLEVEL_OUTOF10: 0 - NO PAIN

## 2025-04-17 NOTE — CARE PLAN
The patient's goals for the shift include free from injury    The clinical goals for the shift include Healthy mom, healthy baby      Problem: Postpartum  Goal: Experiences normal postpartum course  Outcome: Progressing  Goal: Appropriate maternal -  bonding  Outcome: Progressing  Goal: Establish and maintain infant feeding pattern for adequate nutrition  Outcome: Progressing  Goal: Incisions, wounds, or drain sites healing without S/S of infection  Outcome: Progressing  Goal: No s/sx infection  Outcome: Progressing  Goal: No s/sx of hemorrhage  Outcome: Progressing  Goal: Minimal s/sx of HDP and BP<160/110  Outcome: Progressing     Problem: Postpartum hemorrhage  Goal: Hemodynamic stability and limit blood loss  Outcome: Progressing     Problem: Pain - Adult  Goal: Verbalizes/displays adequate comfort level or baseline comfort level  Outcome: Progressing     Problem: Safety - Adult  Goal: Free from fall injury  Outcome: Progressing     Problem: Discharge Planning  Goal: Discharge to home or other facility with appropriate resources  Outcome: Progressing

## 2025-04-17 NOTE — DISCHARGE SUMMARY
Discharge Summary    Admission Date: 4/16/2025  Discharge Date: 4/17/25    Discharge Diagnosis  Amniotic fluid leaking ([facility])  Vaginal delivery    Hospital Course  Delivery Date: 4/16/2025 2:56 AM  Delivery type: Vaginal, Spontaneous   GA at delivery: 41w1d  Outcome: Living  Anesthesia during delivery: None  Intrapartum complications: None  Feeding method: Breastfeeding Status: Yes     Procedures: vaginal delivery  Contraception at discharge: plans to discuss with provider at pp appointment    Pertinent Physical Exam At Time of Discharge  breasts soft, nontender, no s/s  nipple trauma  abdomen non-distended  fundus firm, u/1  Perineum well approximated  Lochia scant to mod    Last Vitals:  Temp Pulse Resp BP MAP Pulse Ox   36.5 °C (97.7 °F) 61 18 115/75 85 96 %     Discharge Meds     Your medication list        START taking these medications        Instructions Last Dose Given Next Dose Due   acetaminophen 500 mg tablet  Commonly known as: Tylenol      Take 2 tablets (1,000 mg) by mouth every 6 hours if needed for mild pain (1 - 3).       ibuprofen 600 mg tablet      Take 1 tablet (600 mg) by mouth every 6 hours if needed for mild pain (1 - 3).              CONTINUE taking these medications        Instructions Last Dose Given Next Dose Due   Auvi-Q 0.3 mg/0.3 mL injection syringe  Generic drug: EPINEPHrine      Inject 0.3 mL (0.3 mg) into the muscle 1 time if needed for anaphylaxis for up to 1 dose. Inject into upper leg. Call 911 after use.       PRENATAL ORAL                     Where to Get Your Medications        These medications were sent to [facility]  [address] , [address], [facility] [id number]      Hours: 8 AM to 6 PM Mon-Fri, 9 AM to 1 PM Saturday Phone: [phone]   acetaminophen 500 mg tablet  ibuprofen 600 mg tablet          Complications Requiring Follow-Up  Usual pp exam    Test Results Pending At Discharge  Pending Labs       No current pending labs.            Outpatient  Follow-Up  No future appointments.    Pat Bernardo, MIKKI-SARAHM

## 2025-05-20 ENCOUNTER — APPOINTMENT (OUTPATIENT)
Dept: OBSTETRICS AND GYNECOLOGY | Facility: CLINIC | Age: 40
End: 2025-05-20
Payer: COMMERCIAL

## 2025-05-20 VITALS — DIASTOLIC BLOOD PRESSURE: 62 MMHG | BODY MASS INDEX: 33.02 KG/M2 | WEIGHT: 210.8 LBS | SYSTOLIC BLOOD PRESSURE: 122 MMHG

## 2025-05-20 DIAGNOSIS — Z30.430 ENCOUNTER FOR INSERTION OF PARAGARD IUD: ICD-10-CM

## 2025-05-20 PROCEDURE — 58300 INSERT INTRAUTERINE DEVICE: CPT | Performed by: OBSTETRICS & GYNECOLOGY

## 2025-05-20 PROCEDURE — 0503F POSTPARTUM CARE VISIT: CPT | Performed by: OBSTETRICS & GYNECOLOGY

## 2025-05-20 ASSESSMENT — PAIN SCALES - GENERAL: PAINLEVEL_OUTOF10: 0-NO PAIN

## 2025-05-20 ASSESSMENT — EDINBURGH POSTNATAL DEPRESSION SCALE (EPDS)
I HAVE BEEN SO UNHAPPY THAT I HAVE BEEN CRYING: NO, NEVER
TOTAL SCORE: 3
I HAVE FELT SAD OR MISERABLE: NO, NOT AT ALL
I HAVE BEEN ABLE TO LAUGH AND SEE THE FUNNY SIDE OF THINGS: AS MUCH AS I ALWAYS COULD
I HAVE BEEN SO UNHAPPY THAT I HAVE HAD DIFFICULTY SLEEPING: NOT AT ALL
I HAVE BEEN ANXIOUS OR WORRIED FOR NO GOOD REASON: HARDLY EVER
I HAVE FELT SCARED OR PANICKY FOR NO GOOD REASON: NO, NOT AT ALL
I HAVE BLAMED MYSELF UNNECESSARILY WHEN THINGS WENT WRONG: NOT VERY OFTEN
THE THOUGHT OF HARMING MYSELF HAS OCCURRED TO ME: NEVER
I HAVE LOOKED FORWARD WITH ENJOYMENT TO THINGS: AS MUCH AS I EVER DID
THINGS HAVE BEEN GETTING ON TOP OF ME: NO, MOST OF THE TIME I HAVE COPED QUITE WELL

## 2025-05-20 NOTE — PROGRESS NOTES
Assessment   IMPRESSIONS:  1. Encounter for postpartum visit (Primary)  - doing well, no concerns    2. Encounter for insertion of ParaGard IUD  - inserted today without difficulty     Follow up for preventative visit in 6-12 months or sooner JOSY Lr MD    Subjective   39 y.o.  presenting for postpartum follow-up     Delivery Date: 25  GA at Delivery: 41w1d  Type of Delivery:     Pregnancy/delivery notable for: Uncomplicated    Concerns: None    Neg pap/HPV in 3/2023  Pain: controlled  Lacerations: first degree, still feels some suture  Lochia: minimal  Menses: Not yet  Sexual Intimacy: not yet  Contraceptive Method: desires paragard IUD today  Feeding Method: Exclusively bf  Lactation Consult Needed?: No  Birth Trauma: No  Bonding with Baby: well   Mood:   No concerns      PHYSICAL EXAM  Objective   /62 (BP Location: Right arm, Patient Position: Sitting, BP Cuff Size: Adult)   Wt 95.6 kg (210 lb 12.8 oz)   LMP 2024   Breastfeeding Yes   BMI 33.02 kg/m²      General:   Alert and oriented, in no acute distress           Abdomen: Soft, non-tender, without masses or organomegaly   Vulva: Normal architecture without erythema, masses, or lesions.    Vagina: Normal mucosa without lesions, masses, or atrophy. Well healed vaginal laceration site, some suture material still present   Cervix: Normal without masses, lesions, or signs of cervicitis.    Uterus: Normal mobile, non-enlarged uterus    Adnexa: Normal without masses or lesions   Pelvic Floor +POP. No high tone pelvic floor    Psych Normal affect. Normal mood.      Patient ID: Pat Rg is a 39 y.o. female.    IUD Management    Performed by: Paulina Lr MD  Authorized by: Paulina Lr MD    Procedure: IUD insertion    Consent obtained by patient, parent, or legal power of  - including discussion of procedure risks and benefits, patient questions answered, and patient education provided:  yes    Pregnancy risk: reasonably certain the patient is not pregnant    Date/Time of Insertion:  5/20/2025 12:56 PM  Immediately prior to procedure a time out was called: yes    Speculum placed in vagina: yes    Cervix cleaned and prepped: yes    Tenaculum/Allis/Ring Forceps applied to cervix: yes    Anesthesia used: no    Uterus sound depth (cm):  9  Cervix manually dilated: no    IUD inserted without complications: yes    OSM: copper 380 square mm  Strings trimmed to (cm):  3  Patient tolerated procedure well: yes

## 2025-07-17 ENCOUNTER — APPOINTMENT (OUTPATIENT)
Facility: CLINIC | Age: 40
End: 2025-07-17
Payer: COMMERCIAL

## 2025-07-17 DIAGNOSIS — M72.2 PLANTAR FASCIITIS: Primary | ICD-10-CM

## 2025-07-17 DIAGNOSIS — M79.672 LEFT FOOT PAIN: ICD-10-CM

## 2025-07-17 PROCEDURE — 1036F TOBACCO NON-USER: CPT | Performed by: PODIATRIST

## 2025-07-17 PROCEDURE — 20550 NJX 1 TENDON SHEATH/LIGAMENT: CPT | Performed by: PODIATRIST

## 2025-07-17 PROCEDURE — 99203 OFFICE O/P NEW LOW 30 MIN: CPT | Performed by: PODIATRIST

## 2025-07-17 NOTE — PROGRESS NOTES
Chief Complaint   Patient presents with    Plantar Fasciitis     History Of Present Illness  Pat Rg is a 39 y.o. female presenting with chief complaint of left heel pain.  This started when she was pregnant.  Since giving birth the pain has subsided some with supportive shoe gear.  She has tried home stretching as well.  She is wondering what else she can do to manage her pain and discomfort.     Past Medical History  She has a past medical history of Depression (Not currently), Migraine, and Plantar fasciitis (May 16, 2025).    Surgical History  She has no past surgical history on file.     Social History  She reports that she has never smoked. She has never used smokeless tobacco. She reports current alcohol use of about 2.0 standard drinks of alcohol per week. She reports that she does not use drugs.    Family History  Family History[1]     Allergies  Tree nut    Medications  Current Medications[2]    Review of Systems    REVIEW OF SYSTEMS  GENERAL:  Negative for malaise, significant weight loss, fever      Objective: Left lower extremity focused    Vasc: DP and PT pulses are palpable.  CFT is less than 3 seconds.  Skin temperature is warm to cool proximal to distal      Neuro:  Light touch is intact to the foot     Derm: There is no acute edema noted to the left foot.  No open lesions.  No acute findings    Ortho: Muscle strength is 5/5 for all pedal groups tested. Equinus contracture noted by Silfverskiold testing.  No pain to the Achilles tendon.  No pain along the posterior tibial tendon.  Negative Tinel's at tarsal canal.  No pain with side-to-side compression of the calcaneus.  Direct pain on palpation of the plantar fascial insertion into the medial plantar heel.     Patient ID: Pat Rg is a 39 y.o. female.    Tendon Sheath Injection: left plantar fascia on 7/17/2025 2:26 PM  Indications: pain  Details: 27 G needle, medial approach  Medications: 4 mg dexAMETHasone 4 mg/mL  Consent was given by  the patient. Immediately prior to procedure a time out was called to verify the correct patient, procedure, equipment, support staff and site/side marked as required. Patient was prepped and draped in the usual sterile fashion.           Assessment/Plan     Diagnoses and all orders for this visit:  Plantar fasciitis  -     Referral to Physical Therapy; Future  Left foot pain  -     Referral to Physical Therapy; Future  Other orders  -     Tendon Sheath Injection      The patient is evaluated and examined.  She is experiencing acute plantar fasciitis to the left foot.  Her pain is no better despite home physical therapy and supportive shoe gear.  She is currently breast-feeding therefore we will avoid prescription anti-inflammatories.  I discussed the role of cortisone injection given this.  She is amendable to this.  Direction given today without complication.  Understands her risk of steroid flare.  Understands there is a chance she may need a second injection.  Will reevaluate this in 8 weeks.  I have recommended formal physical therapy given that she is not responding to home physical therapy.  She was referred today.  If she is no better with formal physical therapy, supportive shoes and injection we may need to consider MRI.  She is agreeable to this plan      Breonna Dean DPM       [1]   Family History  Problem Relation Name Age of Onset    Heart disease Maternal Grandfather Grandfather -  age 59     Breast cancer Paternal Grandmother Grandmother     Breast cancer Father's Sister Aunt    [2]   Current Outpatient Medications   Medication Sig Dispense Refill    EPINEPHrine (Auvi-Q) 0.3 mg/0.3 mL injection syringe Inject 0.3 mL (0.3 mg) into the muscle 1 time if needed for anaphylaxis for up to 1 dose. Inject into upper leg. Call 911 after use. 1 each 0    PNV no.95/ferrous fum/folic ac (PRENATAL ORAL) Take by mouth.       No current facility-administered medications for this visit.

## 2025-07-29 ENCOUNTER — EVALUATION (OUTPATIENT)
Dept: PHYSICAL THERAPY | Facility: CLINIC | Age: 40
End: 2025-07-29
Payer: COMMERCIAL

## 2025-07-29 DIAGNOSIS — M72.2 PLANTAR FASCIITIS OF LEFT FOOT: Primary | ICD-10-CM

## 2025-07-29 PROCEDURE — 97110 THERAPEUTIC EXERCISES: CPT | Mod: GP | Performed by: PHYSICAL THERAPIST

## 2025-07-29 PROCEDURE — 97161 PT EVAL LOW COMPLEX 20 MIN: CPT | Mod: GP | Performed by: PHYSICAL THERAPIST

## 2025-07-29 ASSESSMENT — ENCOUNTER SYMPTOMS
LOSS OF SENSATION IN FEET: 0
DEPRESSION: 0
OCCASIONAL FEELINGS OF UNSTEADINESS: 0

## 2025-07-29 ASSESSMENT — PATIENT HEALTH QUESTIONNAIRE - PHQ9
2. FEELING DOWN, DEPRESSED OR HOPELESS: NOT AT ALL
SUM OF ALL RESPONSES TO PHQ9 QUESTIONS 1 AND 2: 0
1. LITTLE INTEREST OR PLEASURE IN DOING THINGS: NOT AT ALL

## 2025-07-29 ASSESSMENT — COLUMBIA-SUICIDE SEVERITY RATING SCALE - C-SSRS
6. HAVE YOU EVER DONE ANYTHING, STARTED TO DO ANYTHING, OR PREPARED TO DO ANYTHING TO END YOUR LIFE?: NO
1. IN THE PAST MONTH, HAVE YOU WISHED YOU WERE DEAD OR WISHED YOU COULD GO TO SLEEP AND NOT WAKE UP?: NO
2. HAVE YOU ACTUALLY HAD ANY THOUGHTS OF KILLING YOURSELF?: NO

## 2025-07-29 NOTE — PROGRESS NOTES
Physical Therapy    Physical Therapy Evaluation and Treatment     Patient Name:  Pat Rg   Patient MRN: [id number]  Date: 7/29/2025    Time Calculation  Start Time: 0840  Stop Time: 0917  Time Calculation (min): 37 min    Referring provider: Dr. Dianne DPM    Insurance:  Insurance Type:  EMPLOYEE  Visit number: 1    Approved # of visits ?  Authorization Needed: yes  Cert Date Ends On: ?     Therapy diagnoses:   Problem List Items Addressed This Visit    None  Visit Diagnoses         Codes      Plantar fasciitis of left foot    -  Primary M72.2    Relevant Orders    Follow Up In Physical Therapy            Precautions: none  No fall risk    Assessment: Pt is 39 y.o. female c/o left heel and arch pain which started around February/March 2025 while she was pregnant with third child.  Pt recently had cortisone injection which has seemed to help.  Pt demonstrates minimal tenderness to palpation of plantar fascia insertion, full B ankle AROM, decreased left ankle strength, oblique dominant core strategy, and slight decrease in BLE flexibility.  Signs and symptoms consistent with referring MD diagnosis. Pt is a good candidate for skilled PT intervention to meet outlined goals and improve QOL.     Plan:  Interventions: Biofeedback, Cryotherapy, Dry Needling, Education/Instruction, Electrical Stimulation, Home Program, Hot Pack, Kinesiotaping, Manual Therapy, Neuromuscular Re-education, Self Care/Home Management, self-care, Therapeutic Exercises, Ultrasound, Mechanical Traction, Aquatic Therapy, Vasopneumatic device with cold  Frequency and Duration: 1x week for 4-6 weeks   Rehab Potential: good  Plan of Care Agreement: patient      Goals:  Pt will meet the following goals in 6 weeks  Pt will report 0/10 pain with ADL's and functional mobility.  Pt will be independent with HEP at least 3 days per week to maintain gains made in therapy.   Pt will increase left ankle strength 5/5 throughout to assist with squatting,  descending stairs, stooping needed for caring for young children, etc.  LEFS> 78  Pt will activate TA muscle without cues in standing and sitting to help stabilize core needed for lifting, pushing, pulling, etc.         Subjective:    Chief Complaint: left plantar fascia pain started the last month of pregnancy.  Delivered third child 4/16/25.  Pain continued after delivery and especially after returning to work.  Podiatrist injected cortisone injection 7/17/25 which helped but still feel minimal pain.  Pt tried going to speciality shoe and purchased new shoes, googled stretches.      Relevant PMH: migraines, 3 vaginal deliveries     Currently breastfeeding     Pain:  Location: left heel, some radiating to arch  Current: 1/10  Type: achy now, stabbing  At best: 1/10  At worst: 5/10  Makes better: better shoes, stretching, cortisone   Makes worse: morning, after being on feet too long    Home Set up: stairs inside home with handrails    Sleep: no issue    PLOF: no pain with any Wbing activity in the past    Work: , mostly sitting     Exercise: heavy weight lifting 3 days per week, not back into yoga or running     Pt goals for PT: strengthen foot, reduce pain     Objective:    Posture:  Round shoulder, forward head    No pes planus or pes cavus noted     Gait:  Normal pronation during stance phase of gait, non antalgic, independent     SLS:  > 10 seconds with excessive ankle strategy bilaterally      Flexibility:  Min loss B hamstrings and gastroc    Strength (MMT):    Hip flexion: R 5, L 5  Hip abduction: R 4+, L 4+  Hip Extension: R 5, L 5    Knee extension: R 5, L 5  Knee flexion: R 5, L 5    Ankle DF: R 5, L 5  Ankle PF: R 4+, L 4  Ankle INV: R 5, L 4+  Ankle Ev: R 5, L 4      Range of Motion (degrees of motion):    B ankle A/PROM WNL    Palpation/other:  Minimal tenderness to left medial calcaneal tubercle at insertion of PF    Oblique dominant core strategy     Outcome Measure:  LEFS=64    Treatment  today:   1. Initial evaluation   2. Pt ed on diagnosis, prognosis, goals of PT, expectations   3. HEP (see below) ed on normal vs abnormal response  4. Impact of breastfeeding on healing, wearing supportive shoes at work if symptoms return, importance of stretching gastroc and soleus, returning to yoga for hip and core strengthening     Access Code: [email or web address]  URL: [email or web address]  Date: 07/29/2025  Prepared by: Chelsea Swan    Exercises  - Long Sitting Ankle Eversion with Resistance  - 2 x daily - 7 x weekly - 1 sets - 10 reps  - Long Sitting Ankle Inversion with Resistance  - 2 x daily - 7 x weekly - 1 sets - 10 reps  - Long Sitting Ankle Plantar Flexion with Resistance  - 2 x daily - 7 x weekly - 1 sets - 10 reps  - Long Sitting Ankle Dorsiflexion with Anchored Resistance  - 2 x daily - 7 x weekly - 1 sets - 10 reps  - Gastroc Stretch on Wall  - 2 x daily - 7 x weekly - 1 sets - 3 reps - 20 hold  - Soleus Stretch on Wall  - 2 x daily - 7 x weekly - 2 sets - 3 reps - 30 hold  - Gastroc Stretch on Step  - 2 x daily - 7 x weekly - 1 sets - 3 reps - 20-30 hold  - Seated Plantar Fascia Stretch  - 2 x daily - 7 x weekly - 1 sets - 3 reps - 30 hold

## 2025-08-01 ENCOUNTER — OFFICE VISIT (OUTPATIENT)
Dept: URGENT CARE | Age: 40
End: 2025-08-01
Payer: COMMERCIAL

## 2025-08-01 ENCOUNTER — APPOINTMENT (OUTPATIENT)
Dept: PRIMARY CARE | Facility: CLINIC | Age: 40
End: 2025-08-01
Payer: COMMERCIAL

## 2025-08-01 VITALS
RESPIRATION RATE: 16 BRPM | HEART RATE: 65 BPM | DIASTOLIC BLOOD PRESSURE: 77 MMHG | OXYGEN SATURATION: 98 % | SYSTOLIC BLOOD PRESSURE: 112 MMHG | TEMPERATURE: 97.4 F

## 2025-08-01 DIAGNOSIS — H10.33 ACUTE CONJUNCTIVITIS OF BOTH EYES, UNSPECIFIED ACUTE CONJUNCTIVITIS TYPE: Primary | ICD-10-CM

## 2025-08-01 RX ORDER — POLYMYXIN B SULFATE AND TRIMETHOPRIM 1; 10000 MG/ML; [USP'U]/ML
1 SOLUTION OPHTHALMIC 4 TIMES DAILY
Qty: 10 ML | Refills: 0 | Status: SHIPPED | OUTPATIENT
Start: 2025-08-01 | End: 2025-08-26

## 2025-08-01 ASSESSMENT — PAIN SCALES - GENERAL: PAINLEVEL_OUTOF10: 3

## 2025-08-01 ASSESSMENT — ENCOUNTER SYMPTOMS
EYE DISCHARGE: 1
EYE REDNESS: 1
EYE ITCHING: 1

## 2025-08-01 NOTE — PROGRESS NOTES
Subjective   Patient ID: Pat Rg is a 39 y.o. female. They present today with a chief complaint of Eye Problem (Left eye redness with discharge X today. ).    History of Present Illness    Eye Problem  Associated symptoms: discharge, itching and redness          Patient presents to urgent care for complaints of bilateral eye redness and drainage starting today.  Patient states her kids had similar symptoms last week that resolved.  She does not wear contact lenses.  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 She has never used smokeless tobacco. She reports current alcohol use of about 2.0 standard drinks of alcohol per week. She reports that she does not use drugs.    Review of Systems  Review of Systems   Eyes:  Positive for discharge, redness and itching.                                  Objective    Vitals:    08/01/25 1605   BP: 112/77   Pulse: 65   Resp: 16   Temp: 36.3 °C (97.4 °F)   SpO2: 98%     No LMP recorded.    Physical Exam  Vitals reviewed.   Constitutional:       Appearance: Normal appearance.   HENT:      Head: Normocephalic.      Mouth/Throat:      Mouth: Mucous membranes are moist.     Eyes:      General: Lids are normal.         Right eye: No discharge.         Left eye: Discharge present.     Conjunctiva/sclera:      Right eye: Right conjunctiva is injected.      Left eye: Left conjunctiva is injected.       Neurological:      Mental Status: She is alert.         Procedures    Point of Care Test & Imaging Results from this visit  No results found for this visit on 08/01/25.   Imaging  No results found.    Cardiology, Vascular, and Other Imaging  No other imaging results found for the past 2 days      Diagnostic study results (if any) were reviewed by [facility].    Assessment/Plan   Allergies, medications, history, and pertinent labs/EKGs/Imaging reviewed by MIKKI Higuera-CNP.     Medical Decision Making  Will cover patient with antibiotic drops for acute conjunctivitis.  Patient was told the symptoms are not improving or worsening in the next week return to urgent care or follow-up with PCP    At time of discharge patient was clinically well-appearing and HDS for outpatient management. The patient and/or family was educated regarding diagnosis, supportive care, OTC and Rx medications. The patient and/or family was given the opportunity to ask questions prior to discharge.  They verbalized understanding of my discussion of the plans for treatment, expected course, indications to return to  or seek further evaluation in ED, and the need for timely follow up as directed.   They were provided with a work/school excuse if requested.      Orders and Diagnoses  Diagnoses and all orders for this visit:  Acute conjunctivitis of both eyes, unspecified acute conjunctivitis type  -     polymyxin B sulf-trimethoprim (Polytrim) ophthalmic solution; Administer 1 drop into both eyes 4 times a day for 7 days.      Medical Admin Record      Patient disposition: Home    Electronically signed by [facility]  4:08 PM           [1] (Not in a hospital admission)  [2]   Past Medical History:  Diagnosis Date    Depression Not currently    Migraine     Plantar fasciitis May 16, 2025   [3] No past surgical history on file.

## 2025-08-15 ENCOUNTER — TREATMENT (OUTPATIENT)
Dept: PHYSICAL THERAPY | Facility: CLINIC | Age: 40
End: 2025-08-15
Payer: COMMERCIAL

## 2025-08-15 DIAGNOSIS — M72.2 PLANTAR FASCIITIS OF LEFT FOOT: ICD-10-CM

## 2025-08-15 PROCEDURE — 97110 THERAPEUTIC EXERCISES: CPT | Mod: GP

## 2025-08-15 PROCEDURE — 97140 MANUAL THERAPY 1/> REGIONS: CPT | Mod: GP

## 2025-08-20 ENCOUNTER — TREATMENT (OUTPATIENT)
Dept: PHYSICAL THERAPY | Facility: CLINIC | Age: 40
End: 2025-08-20
Payer: COMMERCIAL

## 2025-08-20 DIAGNOSIS — M72.2 PLANTAR FASCIITIS OF LEFT FOOT: ICD-10-CM

## 2025-08-20 PROCEDURE — 97110 THERAPEUTIC EXERCISES: CPT | Mod: GP | Performed by: PHYSICAL THERAPIST

## 2025-08-20 PROCEDURE — 97140 MANUAL THERAPY 1/> REGIONS: CPT | Mod: GP | Performed by: PHYSICAL THERAPIST

## 2025-08-27 ENCOUNTER — TREATMENT (OUTPATIENT)
Dept: PHYSICAL THERAPY | Facility: CLINIC | Age: 40
End: 2025-08-27
Payer: COMMERCIAL

## 2025-08-27 DIAGNOSIS — M72.2 PLANTAR FASCIITIS OF LEFT FOOT: ICD-10-CM

## 2025-08-27 PROCEDURE — 97110 THERAPEUTIC EXERCISES: CPT | Mod: GP | Performed by: PHYSICAL THERAPIST

## 2025-08-27 PROCEDURE — 97140 MANUAL THERAPY 1/> REGIONS: CPT | Mod: GP | Performed by: PHYSICAL THERAPIST

## 2025-09-03 ENCOUNTER — TREATMENT (OUTPATIENT)
Dept: PHYSICAL THERAPY | Facility: CLINIC | Age: 40
End: 2025-09-03
Payer: COMMERCIAL

## 2025-09-03 DIAGNOSIS — M72.2 PLANTAR FASCIITIS OF LEFT FOOT: ICD-10-CM

## 2025-09-03 PROCEDURE — 97140 MANUAL THERAPY 1/> REGIONS: CPT | Mod: GP | Performed by: PHYSICAL THERAPIST

## 2025-12-02 ENCOUNTER — APPOINTMENT (OUTPATIENT)
Dept: DERMATOLOGY | Facility: CLINIC | Age: 40
End: 2025-12-02
Payer: COMMERCIAL